# Patient Record
Sex: MALE | Race: BLACK OR AFRICAN AMERICAN | Employment: FULL TIME | URBAN - METROPOLITAN AREA
[De-identification: names, ages, dates, MRNs, and addresses within clinical notes are randomized per-mention and may not be internally consistent; named-entity substitution may affect disease eponyms.]

---

## 2019-01-23 ENCOUNTER — ANESTHESIA EVENT (OUTPATIENT)
Dept: OPERATING ROOM | Age: 44
End: 2019-01-23
Payer: COMMERCIAL

## 2019-01-25 ENCOUNTER — HOSPITAL ENCOUNTER (OUTPATIENT)
Age: 44
Setting detail: OUTPATIENT SURGERY
Discharge: HOME | End: 2019-01-25
Attending: OPHTHALMOLOGY | Admitting: OPHTHALMOLOGY
Payer: COMMERCIAL

## 2019-01-25 ENCOUNTER — ANESTHESIA (OUTPATIENT)
Dept: OPERATING ROOM | Age: 44
End: 2019-01-25
Payer: COMMERCIAL

## 2019-01-25 VITALS
SYSTOLIC BLOOD PRESSURE: 110 MMHG | RESPIRATION RATE: 17 BRPM | DIASTOLIC BLOOD PRESSURE: 55 MMHG | WEIGHT: 215 LBS | BODY MASS INDEX: 29.12 KG/M2 | OXYGEN SATURATION: 96 % | TEMPERATURE: 97.2 F | HEIGHT: 72 IN | HEART RATE: 64 BPM

## 2019-01-25 VITALS — DIASTOLIC BLOOD PRESSURE: 59 MMHG | OXYGEN SATURATION: 94 % | SYSTOLIC BLOOD PRESSURE: 115 MMHG | TEMPERATURE: 96.1 F

## 2019-01-25 DIAGNOSIS — H50.011 MONOCULAR ESOTROPIA OF RIGHT EYE: Primary | ICD-10-CM

## 2019-01-25 LAB
ANION GAP SERPL CALCULATED.3IONS-SCNC: 10 MMOL/L (ref 7–16)
BUN BLDV-MCNC: 20 MG/DL (ref 6–20)
CALCIUM SERPL-MCNC: 9.3 MG/DL (ref 8.6–10.2)
CHLORIDE BLD-SCNC: 106 MMOL/L (ref 98–107)
CO2: 24 MMOL/L (ref 22–29)
CREAT SERPL-MCNC: 1.1 MG/DL (ref 0.7–1.2)
GFR AFRICAN AMERICAN: >60
GFR NON-AFRICAN AMERICAN: >60 ML/MIN/1.73
GLUCOSE BLD-MCNC: 95 MG/DL (ref 74–99)
HCT VFR BLD CALC: 47.1 % (ref 37–54)
HEMOGLOBIN: 16 G/DL (ref 12.5–16.5)
MCH RBC QN AUTO: 30 PG (ref 26–35)
MCHC RBC AUTO-ENTMCNC: 34 % (ref 32–34.5)
MCV RBC AUTO: 88.2 FL (ref 80–99.9)
PDW BLD-RTO: 13.3 FL (ref 11.5–15)
PLATELET # BLD: 189 E9/L (ref 130–450)
PMV BLD AUTO: 11.1 FL (ref 7–12)
POTASSIUM SERPL-SCNC: 4.2 MMOL/L (ref 3.5–5)
RBC # BLD: 5.34 E12/L (ref 3.8–5.8)
SODIUM BLD-SCNC: 140 MMOL/L (ref 132–146)
WBC # BLD: 6.7 E9/L (ref 4.5–11.5)

## 2019-01-25 PROCEDURE — 7100000010 HC PHASE II RECOVERY - FIRST 15 MIN: Performed by: OPHTHALMOLOGY

## 2019-01-25 PROCEDURE — 7100000011 HC PHASE II RECOVERY - ADDTL 15 MIN: Performed by: OPHTHALMOLOGY

## 2019-01-25 PROCEDURE — 6360000002 HC RX W HCPCS

## 2019-01-25 PROCEDURE — 80048 BASIC METABOLIC PNL TOTAL CA: CPT

## 2019-01-25 PROCEDURE — 2709999900 HC NON-CHARGEABLE SUPPLY: Performed by: OPHTHALMOLOGY

## 2019-01-25 PROCEDURE — 2500000003 HC RX 250 WO HCPCS

## 2019-01-25 PROCEDURE — 6370000000 HC RX 637 (ALT 250 FOR IP)

## 2019-01-25 PROCEDURE — 7100000001 HC PACU RECOVERY - ADDTL 15 MIN: Performed by: OPHTHALMOLOGY

## 2019-01-25 PROCEDURE — 7100000000 HC PACU RECOVERY - FIRST 15 MIN: Performed by: OPHTHALMOLOGY

## 2019-01-25 PROCEDURE — 36415 COLL VENOUS BLD VENIPUNCTURE: CPT

## 2019-01-25 PROCEDURE — 85027 COMPLETE CBC AUTOMATED: CPT

## 2019-01-25 PROCEDURE — 2580000003 HC RX 258

## 2019-01-25 PROCEDURE — 3700000000 HC ANESTHESIA ATTENDED CARE: Performed by: OPHTHALMOLOGY

## 2019-01-25 PROCEDURE — 3600000013 HC SURGERY LEVEL 3 ADDTL 15MIN: Performed by: OPHTHALMOLOGY

## 2019-01-25 PROCEDURE — 6370000000 HC RX 637 (ALT 250 FOR IP): Performed by: OPHTHALMOLOGY

## 2019-01-25 PROCEDURE — 3700000001 HC ADD 15 MINUTES (ANESTHESIA): Performed by: OPHTHALMOLOGY

## 2019-01-25 PROCEDURE — 3600000003 HC SURGERY LEVEL 3 BASE: Performed by: OPHTHALMOLOGY

## 2019-01-25 RX ORDER — DEXAMETHASONE SODIUM PHOSPHATE 4 MG/ML
INJECTION, SOLUTION INTRA-ARTICULAR; INTRALESIONAL; INTRAMUSCULAR; INTRAVENOUS; SOFT TISSUE PRN
Status: DISCONTINUED | OUTPATIENT
Start: 2019-01-25 | End: 2019-01-25 | Stop reason: SDUPTHER

## 2019-01-25 RX ORDER — OXYCODONE HYDROCHLORIDE AND ACETAMINOPHEN 5; 325 MG/1; MG/1
1 TABLET ORAL
Status: DISCONTINUED | OUTPATIENT
Start: 2019-01-25 | End: 2019-01-25 | Stop reason: HOSPADM

## 2019-01-25 RX ORDER — PROMETHAZINE HYDROCHLORIDE 25 MG/ML
6.25 INJECTION, SOLUTION INTRAMUSCULAR; INTRAVENOUS
Status: DISCONTINUED | OUTPATIENT
Start: 2019-01-25 | End: 2019-01-25 | Stop reason: HOSPADM

## 2019-01-25 RX ORDER — SODIUM CHLORIDE, SODIUM LACTATE, POTASSIUM CHLORIDE, CALCIUM CHLORIDE 600; 310; 30; 20 MG/100ML; MG/100ML; MG/100ML; MG/100ML
INJECTION, SOLUTION INTRAVENOUS CONTINUOUS PRN
Status: DISCONTINUED | OUTPATIENT
Start: 2019-01-25 | End: 2019-01-25 | Stop reason: SDUPTHER

## 2019-01-25 RX ORDER — PROPOFOL 10 MG/ML
INJECTION, EMULSION INTRAVENOUS PRN
Status: DISCONTINUED | OUTPATIENT
Start: 2019-01-25 | End: 2019-01-25 | Stop reason: SDUPTHER

## 2019-01-25 RX ORDER — GLYCOPYRROLATE 0.2 MG/ML
INJECTION INTRAMUSCULAR; INTRAVENOUS PRN
Status: DISCONTINUED | OUTPATIENT
Start: 2019-01-25 | End: 2019-01-25 | Stop reason: SDUPTHER

## 2019-01-25 RX ORDER — FENTANYL CITRATE 50 UG/ML
25 INJECTION, SOLUTION INTRAMUSCULAR; INTRAVENOUS EVERY 5 MIN PRN
Status: DISCONTINUED | OUTPATIENT
Start: 2019-01-25 | End: 2019-01-25 | Stop reason: HOSPADM

## 2019-01-25 RX ORDER — NEOSTIGMINE METHYLSULFATE 1 MG/ML
INJECTION, SOLUTION INTRAVENOUS PRN
Status: DISCONTINUED | OUTPATIENT
Start: 2019-01-25 | End: 2019-01-25 | Stop reason: SDUPTHER

## 2019-01-25 RX ORDER — SUCCINYLCHOLINE CHLORIDE 20 MG/ML
INJECTION INTRAMUSCULAR; INTRAVENOUS PRN
Status: DISCONTINUED | OUTPATIENT
Start: 2019-01-25 | End: 2019-01-25 | Stop reason: SDUPTHER

## 2019-01-25 RX ORDER — NEOMYCIN SULFATE, POLYMYXIN B SULFATE, AND DEXAMETHASONE 3.5; 10000; 1 MG/G; [USP'U]/G; MG/G
OINTMENT OPHTHALMIC PRN
Status: DISCONTINUED | OUTPATIENT
Start: 2019-01-25 | End: 2019-01-25 | Stop reason: HOSPADM

## 2019-01-25 RX ORDER — MEPERIDINE HYDROCHLORIDE 25 MG/ML
12.5 INJECTION INTRAMUSCULAR; INTRAVENOUS; SUBCUTANEOUS EVERY 5 MIN PRN
Status: DISCONTINUED | OUTPATIENT
Start: 2019-01-25 | End: 2019-01-25 | Stop reason: HOSPADM

## 2019-01-25 RX ORDER — LIDOCAINE HYDROCHLORIDE 20 MG/ML
INJECTION, SOLUTION EPIDURAL; INFILTRATION; INTRACAUDAL; PERINEURAL PRN
Status: DISCONTINUED | OUTPATIENT
Start: 2019-01-25 | End: 2019-01-25 | Stop reason: SDUPTHER

## 2019-01-25 RX ORDER — FENTANYL CITRATE 50 UG/ML
50 INJECTION, SOLUTION INTRAMUSCULAR; INTRAVENOUS EVERY 5 MIN PRN
Status: DISCONTINUED | OUTPATIENT
Start: 2019-01-25 | End: 2019-01-25 | Stop reason: HOSPADM

## 2019-01-25 RX ORDER — MIDAZOLAM HYDROCHLORIDE 1 MG/ML
INJECTION INTRAMUSCULAR; INTRAVENOUS PRN
Status: DISCONTINUED | OUTPATIENT
Start: 2019-01-25 | End: 2019-01-25 | Stop reason: SDUPTHER

## 2019-01-25 RX ORDER — DIPHENHYDRAMINE HYDROCHLORIDE 50 MG/ML
12.5 INJECTION INTRAMUSCULAR; INTRAVENOUS
Status: DISCONTINUED | OUTPATIENT
Start: 2019-01-25 | End: 2019-01-25 | Stop reason: HOSPADM

## 2019-01-25 RX ORDER — PHENYLEPHRINE HCL 2.5 %
DROPS OPHTHALMIC (EYE) PRN
Status: DISCONTINUED | OUTPATIENT
Start: 2019-01-25 | End: 2019-01-25 | Stop reason: HOSPADM

## 2019-01-25 RX ORDER — ROCURONIUM BROMIDE 10 MG/ML
INJECTION, SOLUTION INTRAVENOUS PRN
Status: DISCONTINUED | OUTPATIENT
Start: 2019-01-25 | End: 2019-01-25 | Stop reason: SDUPTHER

## 2019-01-25 RX ORDER — ESMOLOL HYDROCHLORIDE 10 MG/ML
INJECTION INTRAVENOUS PRN
Status: DISCONTINUED | OUTPATIENT
Start: 2019-01-25 | End: 2019-01-25 | Stop reason: SDUPTHER

## 2019-01-25 RX ORDER — ONDANSETRON 2 MG/ML
INJECTION INTRAMUSCULAR; INTRAVENOUS PRN
Status: DISCONTINUED | OUTPATIENT
Start: 2019-01-25 | End: 2019-01-25 | Stop reason: SDUPTHER

## 2019-01-25 RX ORDER — FENTANYL CITRATE 50 UG/ML
INJECTION, SOLUTION INTRAMUSCULAR; INTRAVENOUS PRN
Status: DISCONTINUED | OUTPATIENT
Start: 2019-01-25 | End: 2019-01-25 | Stop reason: SDUPTHER

## 2019-01-25 RX ORDER — ACETAMINOPHEN AND CODEINE PHOSPHATE 300; 30 MG/1; MG/1
1-2 TABLET ORAL EVERY 8 HOURS PRN
Qty: 30 TABLET | Refills: 0 | Status: SHIPPED | OUTPATIENT
Start: 2019-01-25 | End: 2019-02-01

## 2019-01-25 RX ADMIN — MIDAZOLAM HYDROCHLORIDE 2 MG: 1 INJECTION, SOLUTION INTRAMUSCULAR; INTRAVENOUS at 07:16

## 2019-01-25 RX ADMIN — GLYCOPYRROLATE 0.2 MG: 0.2 INJECTION, SOLUTION INTRAMUSCULAR; INTRAVENOUS at 08:03

## 2019-01-25 RX ADMIN — FENTANYL CITRATE 50 MCG: 50 INJECTION, SOLUTION INTRAMUSCULAR; INTRAVENOUS at 07:29

## 2019-01-25 RX ADMIN — DEXAMETHASONE SODIUM PHOSPHATE 10 MG: 4 INJECTION, SOLUTION INTRAMUSCULAR; INTRAVENOUS at 07:29

## 2019-01-25 RX ADMIN — PROPOFOL 160 MG: 10 INJECTION, EMULSION INTRAVENOUS at 07:20

## 2019-01-25 RX ADMIN — PHENYLEPHRINE HYDROCHLORIDE 50 MCG: 10 INJECTION INTRAVENOUS at 07:20

## 2019-01-25 RX ADMIN — PHENYLEPHRINE HYDROCHLORIDE 50 MCG: 10 INJECTION INTRAVENOUS at 07:35

## 2019-01-25 RX ADMIN — PHENYLEPHRINE HYDROCHLORIDE 50 MCG: 10 INJECTION INTRAVENOUS at 07:33

## 2019-01-25 RX ADMIN — SUCCINYLCHOLINE CHLORIDE 120 MG: 20 INJECTION, SOLUTION INTRAMUSCULAR; INTRAVENOUS at 07:20

## 2019-01-25 RX ADMIN — PHENYLEPHRINE HYDROCHLORIDE 50 MCG: 10 INJECTION INTRAVENOUS at 07:55

## 2019-01-25 RX ADMIN — ONDANSETRON HYDROCHLORIDE 4 MG: 2 INJECTION, SOLUTION INTRAMUSCULAR; INTRAVENOUS at 07:20

## 2019-01-25 RX ADMIN — SODIUM CHLORIDE, POTASSIUM CHLORIDE, SODIUM LACTATE AND CALCIUM CHLORIDE: 600; 310; 30; 20 INJECTION, SOLUTION INTRAVENOUS at 07:16

## 2019-01-25 RX ADMIN — ESMOLOL HYDROCHLORIDE 5 MG: 10 INJECTION, SOLUTION INTRAVENOUS at 07:20

## 2019-01-25 RX ADMIN — FENTANYL CITRATE 50 MCG: 50 INJECTION, SOLUTION INTRAMUSCULAR; INTRAVENOUS at 07:20

## 2019-01-25 RX ADMIN — PHENYLEPHRINE HYDROCHLORIDE 50 MCG: 10 INJECTION INTRAVENOUS at 07:42

## 2019-01-25 RX ADMIN — LIDOCAINE HYDROCHLORIDE 100 MG: 20 INJECTION, SOLUTION EPIDURAL; INFILTRATION; INTRACAUDAL; PERINEURAL at 07:20

## 2019-01-25 RX ADMIN — ESMOLOL HYDROCHLORIDE 5 MG: 10 INJECTION, SOLUTION INTRAVENOUS at 07:25

## 2019-01-25 RX ADMIN — FENTANYL CITRATE 25 MCG: 50 INJECTION, SOLUTION INTRAMUSCULAR; INTRAVENOUS at 07:55

## 2019-01-25 RX ADMIN — Medication 3 MG: at 08:03

## 2019-01-25 RX ADMIN — ROCURONIUM BROMIDE 20 MG: 10 SOLUTION INTRAVENOUS at 07:29

## 2019-01-25 ASSESSMENT — PULMONARY FUNCTION TESTS
PIF_VALUE: 19
PIF_VALUE: 17
PIF_VALUE: 17
PIF_VALUE: 16
PIF_VALUE: 17
PIF_VALUE: 6
PIF_VALUE: 17
PIF_VALUE: 18
PIF_VALUE: 2
PIF_VALUE: 7
PIF_VALUE: 7
PIF_VALUE: 17
PIF_VALUE: 2
PIF_VALUE: 17
PIF_VALUE: 17
PIF_VALUE: 3
PIF_VALUE: 24
PIF_VALUE: 10
PIF_VALUE: 1
PIF_VALUE: 2
PIF_VALUE: 12
PIF_VALUE: 17
PIF_VALUE: 17
PIF_VALUE: 2
PIF_VALUE: 17
PIF_VALUE: 17
PIF_VALUE: 18
PIF_VALUE: 13
PIF_VALUE: 17
PIF_VALUE: 17
PIF_VALUE: 6
PIF_VALUE: 2
PIF_VALUE: 17
PIF_VALUE: 2
PIF_VALUE: 17
PIF_VALUE: 26
PIF_VALUE: 25
PIF_VALUE: 1
PIF_VALUE: 17
PIF_VALUE: 5
PIF_VALUE: 10
PIF_VALUE: 17
PIF_VALUE: 17
PIF_VALUE: 3
PIF_VALUE: 15
PIF_VALUE: 22
PIF_VALUE: 16
PIF_VALUE: 1
PIF_VALUE: 17
PIF_VALUE: 3
PIF_VALUE: 17
PIF_VALUE: 2
PIF_VALUE: 17
PIF_VALUE: 1
PIF_VALUE: 1
PIF_VALUE: 17
PIF_VALUE: 5
PIF_VALUE: 8
PIF_VALUE: 12
PIF_VALUE: 2

## 2019-01-25 ASSESSMENT — PAIN SCALES - GENERAL
PAINLEVEL_OUTOF10: 0

## 2019-01-25 ASSESSMENT — PAIN - FUNCTIONAL ASSESSMENT: PAIN_FUNCTIONAL_ASSESSMENT: 0-10

## 2019-06-21 ENCOUNTER — HOSPITAL ENCOUNTER (OUTPATIENT)
Dept: NON INVASIVE DIAGNOSTICS | Age: 44
Discharge: HOME | End: 2019-06-21
Payer: COMMERCIAL

## 2019-06-21 ENCOUNTER — HOSPITAL ENCOUNTER (OUTPATIENT)
Dept: NUCLEAR MEDICINE | Age: 44
Discharge: HOME | End: 2019-06-21
Payer: COMMERCIAL

## 2019-06-21 VITALS — HEIGHT: 72 IN | WEIGHT: 210 LBS | BODY MASS INDEX: 28.44 KG/M2

## 2019-06-21 LAB
LV EF: 69 %
LVEF MODALITY: NORMAL

## 2019-06-21 PROCEDURE — 93017 CV STRESS TEST TRACING ONLY: CPT

## 2019-06-21 PROCEDURE — 3430000000 HC RX DIAGNOSTIC RADIOPHARMACEUTICAL: Performed by: RADIOLOGY

## 2019-06-21 PROCEDURE — 6360000002 HC RX W HCPCS: Performed by: GENERAL PRACTICE

## 2019-06-21 PROCEDURE — A9500 TC99M SESTAMIBI: HCPCS | Performed by: RADIOLOGY

## 2019-06-21 PROCEDURE — 93018 CV STRESS TEST I&R ONLY: CPT | Performed by: INTERNAL MEDICINE

## 2019-06-21 PROCEDURE — 78452 HT MUSCLE IMAGE SPECT MULT: CPT

## 2019-06-21 PROCEDURE — 93016 CV STRESS TEST SUPVJ ONLY: CPT | Performed by: INTERNAL MEDICINE

## 2019-06-21 RX ADMIN — Medication 10 MILLICURIE: at 10:07

## 2019-06-21 RX ADMIN — REGADENOSON 0.4 MG: 0.08 INJECTION, SOLUTION INTRAVENOUS at 11:17

## 2019-06-21 RX ADMIN — Medication 30 MILLICURIE: at 10:07

## 2019-06-21 NOTE — PROGRESS NOTES
.        Stress Lab:  A Lexiscan   stress protocol was performed. There was no angina, significant arrhythmia or ST segment shift. The EKG is considered nonischemic. Isotope was injected.

## 2019-07-17 ENCOUNTER — OFFICE VISIT (OUTPATIENT)
Dept: CARDIOLOGY CLINIC | Age: 44
End: 2019-07-17
Payer: COMMERCIAL

## 2019-07-17 VITALS
DIASTOLIC BLOOD PRESSURE: 70 MMHG | WEIGHT: 222 LBS | SYSTOLIC BLOOD PRESSURE: 110 MMHG | HEIGHT: 72 IN | HEART RATE: 83 BPM | BODY MASS INDEX: 30.07 KG/M2

## 2019-07-17 DIAGNOSIS — E78.00 HYPERCHOLESTEREMIA: ICD-10-CM

## 2019-07-17 DIAGNOSIS — Z87.820 H/O TRAUMATIC BRAIN INJURY: ICD-10-CM

## 2019-07-17 DIAGNOSIS — R94.31 ABNORMAL EKG: ICD-10-CM

## 2019-07-17 DIAGNOSIS — R94.39 ABNORMAL NUCLEAR STRESS TEST: Primary | ICD-10-CM

## 2019-07-17 DIAGNOSIS — E66.9 MILD OBESITY: ICD-10-CM

## 2019-07-17 DIAGNOSIS — K21.9 GASTROESOPHAGEAL REFLUX DISEASE, ESOPHAGITIS PRESENCE NOT SPECIFIED: ICD-10-CM

## 2019-07-17 DIAGNOSIS — I42.1 HOCM (HYPERTROPHIC OBSTRUCTIVE CARDIOMYOPATHY) (HCC): ICD-10-CM

## 2019-07-17 PROCEDURE — 93000 ELECTROCARDIOGRAM COMPLETE: CPT | Performed by: INTERNAL MEDICINE

## 2019-07-17 PROCEDURE — 99244 OFF/OP CNSLTJ NEW/EST MOD 40: CPT | Performed by: INTERNAL MEDICINE

## 2019-07-17 RX ORDER — TRAMADOL HYDROCHLORIDE 50 MG/1
50 TABLET ORAL EVERY 6 HOURS PRN
Refills: 0 | COMMUNITY
Start: 2019-07-09

## 2019-07-17 RX ORDER — OMEPRAZOLE 20 MG/1
20 CAPSULE, DELAYED RELEASE ORAL
Refills: 0 | COMMUNITY
Start: 2019-06-17

## 2019-07-17 RX ORDER — METOPROLOL TARTRATE 100 MG/1
100 TABLET ORAL 2 TIMES DAILY
Refills: 0 | COMMUNITY
Start: 2019-06-17 | End: 2022-03-10 | Stop reason: CLARIF

## 2019-07-17 NOTE — PATIENT INSTRUCTIONS
the  may tell you to chew 1 adult-strength or 2 to 4 low-dose aspirin. Wait for an ambulance. Do not try to drive yourself.   Watch closely for changes in your health, and be sure to contact your doctor if you have any problems. Where can you learn more? Go to https://chpepiceweb.[employer]. org and sign in to your [employer] account. Enter F573 in the aWhere box to learn more about \"A Healthy Heart: Care Instructions. \"     If you do not have an account, please click on the \"Sign Up Now\" link. Current as of: July 22, 2018  Content Version: 12.0  © 8324-9029 Healthwise, Incorporated. Care instructions adapted under license by [facility] ([facility]). If you have questions about a medical condition or this instruction, always ask your healthcare professional. [address] any warranty or liability for your use of this information.

## 2019-07-24 ENCOUNTER — HOSPITAL ENCOUNTER (OUTPATIENT)
Age: 44
Discharge: HOME | End: 2019-07-24
Payer: COMMERCIAL

## 2019-07-24 ENCOUNTER — HOSPITAL ENCOUNTER (OUTPATIENT)
Age: 44
Discharge: HOME | End: 2019-07-26
Payer: COMMERCIAL

## 2019-07-24 ENCOUNTER — HOSPITAL ENCOUNTER (OUTPATIENT)
Dept: GENERAL RADIOLOGY | Age: 44
Discharge: HOME | End: 2019-07-26
Payer: COMMERCIAL

## 2019-07-24 DIAGNOSIS — R94.31 ABNORMAL EKG: ICD-10-CM

## 2019-07-24 DIAGNOSIS — R94.39 ABNORMAL NUCLEAR STRESS TEST: ICD-10-CM

## 2019-07-24 LAB
ALBUMIN SERPL-MCNC: 4.4 G/DL (ref 3.5–5.2)
ALP BLD-CCNC: 58 U/L (ref 40–129)
ALT SERPL-CCNC: 43 U/L (ref 0–40)
ANION GAP SERPL CALCULATED.3IONS-SCNC: 12 MMOL/L (ref 7–16)
AST SERPL-CCNC: 19 U/L (ref 0–39)
BASOPHILS ABSOLUTE: 0.04 E9/L (ref 0–0.2)
BASOPHILS RELATIVE PERCENT: 0.6 % (ref 0–2)
BILIRUB SERPL-MCNC: 1.4 MG/DL (ref 0–1.2)
BUN BLDV-MCNC: 16 MG/DL (ref 6–20)
CALCIUM SERPL-MCNC: 9.5 MG/DL (ref 8.6–10.2)
CHLORIDE BLD-SCNC: 107 MMOL/L (ref 98–107)
CO2: 26 MMOL/L (ref 22–29)
CREAT SERPL-MCNC: 1.3 MG/DL (ref 0.7–1.2)
EOSINOPHILS ABSOLUTE: 0.28 E9/L (ref 0.05–0.5)
EOSINOPHILS RELATIVE PERCENT: 4.4 % (ref 0–6)
GFR AFRICAN AMERICAN: >60
GFR NON-AFRICAN AMERICAN: >60 ML/MIN/1.73
GLUCOSE BLD-MCNC: 96 MG/DL (ref 74–99)
HCT VFR BLD CALC: 45.7 % (ref 37–54)
HEMOGLOBIN: 15.7 G/DL (ref 12.5–16.5)
IMMATURE GRANULOCYTES #: 0 E9/L
IMMATURE GRANULOCYTES %: 0 % (ref 0–5)
INR BLD: 1.1
LYMPHOCYTES ABSOLUTE: 1.98 E9/L (ref 1.5–4)
LYMPHOCYTES RELATIVE PERCENT: 30.8 % (ref 20–42)
MCH RBC QN AUTO: 30.2 PG (ref 26–35)
MCHC RBC AUTO-ENTMCNC: 34.4 % (ref 32–34.5)
MCV RBC AUTO: 87.9 FL (ref 80–99.9)
MONOCYTES ABSOLUTE: 0.7 E9/L (ref 0.1–0.95)
MONOCYTES RELATIVE PERCENT: 10.9 % (ref 2–12)
NEUTROPHILS ABSOLUTE: 3.43 E9/L (ref 1.8–7.3)
NEUTROPHILS RELATIVE PERCENT: 53.3 % (ref 43–80)
PDW BLD-RTO: 13.4 FL (ref 11.5–15)
PLATELET # BLD: 193 E9/L (ref 130–450)
PMV BLD AUTO: 11.1 FL (ref 7–12)
POTASSIUM SERPL-SCNC: 4.2 MMOL/L (ref 3.5–5)
PROTHROMBIN TIME: 12.3 SEC (ref 9.3–12.4)
RBC # BLD: 5.2 E12/L (ref 3.8–5.8)
SODIUM BLD-SCNC: 145 MMOL/L (ref 132–146)
TOTAL PROTEIN: 7.1 G/DL (ref 6.4–8.3)
WBC # BLD: 6.4 E9/L (ref 4.5–11.5)

## 2019-07-24 PROCEDURE — 85025 COMPLETE CBC W/AUTO DIFF WBC: CPT

## 2019-07-24 PROCEDURE — 85610 PROTHROMBIN TIME: CPT

## 2019-07-24 PROCEDURE — 36415 COLL VENOUS BLD VENIPUNCTURE: CPT

## 2019-07-24 PROCEDURE — 80053 COMPREHEN METABOLIC PANEL: CPT

## 2019-07-24 PROCEDURE — 71046 X-RAY EXAM CHEST 2 VIEWS: CPT

## 2019-07-26 ENCOUNTER — HOSPITAL ENCOUNTER (OUTPATIENT)
Dept: CARDIAC CATH/INVASIVE PROCEDURES | Age: 44
Discharge: HOME | End: 2019-07-26
Payer: COMMERCIAL

## 2019-07-26 VITALS
SYSTOLIC BLOOD PRESSURE: 130 MMHG | BODY MASS INDEX: 30.1 KG/M2 | HEIGHT: 71 IN | WEIGHT: 215 LBS | TEMPERATURE: 98.1 F | OXYGEN SATURATION: 96 % | HEART RATE: 65 BPM | RESPIRATION RATE: 16 BRPM | DIASTOLIC BLOOD PRESSURE: 72 MMHG

## 2019-07-26 LAB
ABO/RH: NORMAL
ANTIBODY SCREEN: NORMAL

## 2019-07-26 PROCEDURE — 2709999900 HC NON-CHARGEABLE SUPPLY

## 2019-07-26 PROCEDURE — 93458 L HRT ARTERY/VENTRICLE ANGIO: CPT | Performed by: INTERNAL MEDICINE

## 2019-07-26 PROCEDURE — C1894 INTRO/SHEATH, NON-LASER: HCPCS

## 2019-07-26 PROCEDURE — 6360000002 HC RX W HCPCS

## 2019-07-26 PROCEDURE — 86850 RBC ANTIBODY SCREEN: CPT

## 2019-07-26 PROCEDURE — C1725 CATH, TRANSLUMIN NON-LASER: HCPCS

## 2019-07-26 PROCEDURE — 2500000003 HC RX 250 WO HCPCS

## 2019-07-26 PROCEDURE — C1769 GUIDE WIRE: HCPCS

## 2019-07-26 PROCEDURE — 36415 COLL VENOUS BLD VENIPUNCTURE: CPT

## 2019-07-26 PROCEDURE — 86901 BLOOD TYPING SEROLOGIC RH(D): CPT

## 2019-07-26 PROCEDURE — 86900 BLOOD TYPING SEROLOGIC ABO: CPT

## 2019-07-26 RX ORDER — SODIUM CHLORIDE 9 MG/ML
INJECTION, SOLUTION INTRAVENOUS CONTINUOUS
OUTPATIENT
Start: 2019-07-26

## 2019-08-08 DIAGNOSIS — R94.39 ABNORMAL NUCLEAR STRESS TEST: ICD-10-CM

## 2019-08-08 DIAGNOSIS — R94.31 ABNORMAL EKG: ICD-10-CM

## 2020-03-10 ENCOUNTER — INITIAL CONSULT (OUTPATIENT)
Dept: NEUROSURGERY | Age: 45
End: 2020-03-10
Payer: COMMERCIAL

## 2020-03-10 VITALS
DIASTOLIC BLOOD PRESSURE: 79 MMHG | BODY MASS INDEX: 31.19 KG/M2 | WEIGHT: 222.8 LBS | SYSTOLIC BLOOD PRESSURE: 121 MMHG | HEIGHT: 71 IN | HEART RATE: 70 BPM

## 2020-03-10 PROCEDURE — 99203 OFFICE O/P NEW LOW 30 MIN: CPT | Performed by: PHYSICIAN ASSISTANT

## 2020-03-10 RX ORDER — PRAZOSIN HYDROCHLORIDE 2 MG/1
2 CAPSULE ORAL NIGHTLY
COMMUNITY

## 2020-03-10 ASSESSMENT — ENCOUNTER SYMPTOMS
RESPIRATORY NEGATIVE: 1
GASTROINTESTINAL NEGATIVE: 1
ALLERGIC/IMMUNOLOGIC NEGATIVE: 1
EYES NEGATIVE: 1
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
## 2020-03-19 ENCOUNTER — OFFICE VISIT (OUTPATIENT)
Dept: PAIN MANAGEMENT | Age: 45
End: 2020-03-19
Payer: COMMERCIAL

## 2020-03-19 VITALS
TEMPERATURE: 98.5 F | RESPIRATION RATE: 16 BRPM | HEART RATE: 74 BPM | SYSTOLIC BLOOD PRESSURE: 104 MMHG | WEIGHT: 212 LBS | DIASTOLIC BLOOD PRESSURE: 78 MMHG | HEIGHT: 72 IN | BODY MASS INDEX: 28.71 KG/M2

## 2020-03-19 PROBLEM — M47.812 CERVICAL SPONDYLOSIS: Status: ACTIVE | Noted: 2020-03-19

## 2020-03-19 PROBLEM — M50.90 CERVICAL DISC DISORDER: Status: ACTIVE | Noted: 2020-03-19

## 2020-03-19 PROBLEM — M54.2 NECK PAIN: Status: ACTIVE | Noted: 2020-03-19

## 2020-03-19 PROBLEM — M54.12 CERVICAL RADICULOPATHY: Status: ACTIVE | Noted: 2020-03-19

## 2020-03-19 PROCEDURE — 99204 OFFICE O/P NEW MOD 45 MIN: CPT | Performed by: PAIN MEDICINE

## 2020-03-19 NOTE — PROGRESS NOTES
Do you currently have any of the following:    Fever: No  Headache:  No  Cough: No  Shortness of breath: No  Exposed to anyone with these symptoms: No                                                                                                                Floyd Hamilton presents to the [address] [id number] on 3/19/2020. Sunny Ascencio is complaining of pain in his neck and low back. . The pain is constant. The pain is described as throbbing, shooting, sharp and miserable. Pain is rated on his best day at a 6, on his worst day at a 8, and on average at a 7 on the VAS scale. He took his last dose of Tramadol week ago. Sunny Ascencio does not have issues with constipation. Any procedures since your last visit: No    He is  on NSAIDS and  is not on anticoagulation medications to include ASA and is managed by Dr Lizz Dailey in [place]. Pacemaker or defibrilator: No.       /78   Pulse 74   Temp 98.5 °F (36.9 °C) (Oral)   Resp 16   Ht 6' (1.829 m)   Wt 212 lb (96.2 kg)   BMI 28.75 kg/m²      No LMP for male patient.
by mouth  6/17/19  Yes Historical Provider, MD   Lutein 6 MG CAPS Take 1 tablet by mouth daily Ld 1/21/2019   Yes Historical Provider, MD   Multiple Vitamins-Minerals (THERAPEUTIC MULTIVITAMIN-MINERALS) tablet Take 1 tablet by mouth daily Ld 1/21/2019   Yes Historical Provider, MD   simvastatin (ZOCOR) 40 MG tablet Take 40 mg by mouth nightly.    Yes Historical Provider, MD   metoprolol (TOPROL-XL) 50 MG XL tablet Take 100 mg by mouth 2 times daily Instructed to take am of procedure   Yes Historical Provider, MD   aspirin 81 MG EC tablet Take 81 mg by mouth daily Pt instructed to check hold with Dr. Cindy Wilkins   Yes Historical Provider, MD     No Known Allergies    Social History     Socioeconomic History    Marital status:      Spouse name: Not on file    Number of children: Not on file    Years of education: Not on file    Highest education level: Not on file   Occupational History    Not on file   Social Needs    Financial resource strain: Not on file    Food insecurity     Worry: Not on file     Inability: Not on file    Transportation needs     Medical: Not on file     Non-medical: Not on file   Tobacco Use    Smoking status: Never Smoker    Smokeless tobacco: Never Used   Substance and Sexual Activity    Alcohol use: No     Comment: Rarely, No coffee    Drug use: No    Sexual activity: Not Currently   Lifestyle    Physical activity     Days per week: Not on file     Minutes per session: Not on file    Stress: Not on file   Relationships    Social connections     Talks on phone: Not on file     Gets together: Not on file     Attends Voodoo service: Not on file     Active member of club or organization: Not on file     Attends meetings of clubs or organizations: Not on file     Relationship status: Not on file    Intimate partner violence     Fear of current or ex partner: Not on file     Emotionally abused: Not on file     Physically abused: Not on file     Forced sexual activity: Not on

## 2020-05-12 ENCOUNTER — TELEPHONE (OUTPATIENT)
Dept: PAIN MANAGEMENT | Age: 45
End: 2020-05-12

## 2020-05-19 ENCOUNTER — VIRTUAL VISIT (OUTPATIENT)
Dept: PAIN MANAGEMENT | Age: 45
End: 2020-05-19
Payer: COMMERCIAL

## 2020-05-19 PROCEDURE — 99441 PR PHYS/QHP TELEPHONE EVALUATION 5-10 MIN: CPT | Performed by: PAIN MEDICINE

## 2020-05-19 NOTE — PROGRESS NOTES
Mavis Cho was read the following message We want to confirm that, for purposes of billing, this is a virtual visit with your provider for which we will submit a claim for reimbursement with your insurance company. You will be responsible for any copays, coinsurance amounts or other amounts not covered by your insurance company. If you do not accept this, unfortunately we will not be able to schedule a virtual visit with the provider. Do you accept? Lilian Galloway responded Yes .
shortness of breath, new bowel or bladder complaints. All other review of systems was negative. PHYSICAL EXAMINATION:      General:       A & O x3    Lungs:    Breathing:Breathing Pattern: regular, no distress    Impression:    Neck pain with radiation to the Left upper extremity   Cervical spine MRI 2019 C7-T1 Left paracentral disc protrusion  Plan:  Follow up on his neck pain with no acute issues. Patient is scheduled for JAMIN C7-T1 Left paramedian. Re-discussed R/B/A  Continue with Mobic 7.5 mg. Not a candidate for opioids (on medical [facility])  OARRS report reviewed. Patient encouraged to stay active. Treatment plan discussed with the patient including medications and procedure side effects. We discussed with the patient that combining opioids, benzodiazepines, alcohol, illicit drugs or sleep aids increases the risk of respiratory depression including death. We discussed that these medications may cause drowsiness, sedation or dizziness and have counseled the patient not to drive or operate machinery. We have discussed that these medications will be prescribed only by one provider. We have discussed with the patient about age related risk factors and have thoroughly discussed the importance of taking these medications as prescribed. The patient verbalizes understanding. Patient advised regarding steps to help prevent the spread of COVID-19   SOURCE - https://bladimir-sanders.info/. html     1-Stay home except to get medical care  2-Clean your hands often for atleast 20 seconds, avoid touching: Avoid touching your eyes, nose, and mouth with unwashed hands. 3-Seek medical attention: Seek prompt medical attention if your illness is worsening (e.g., difficulty breathing).   Call you doctor first.  3-Wear a facemask if you are sick   4-Cover your coughs and sneezes         I affirm this is a Patient Initiated Episode with an established Patient who has not had a related

## 2020-05-22 ENCOUNTER — HOSPITAL ENCOUNTER (OUTPATIENT)
Age: 45
Discharge: HOME | End: 2020-05-24
Payer: COMMERCIAL

## 2020-05-22 PROCEDURE — U0003 INFECTIOUS AGENT DETECTION BY NUCLEIC ACID (DNA OR RNA); SEVERE ACUTE RESPIRATORY SYNDROME CORONAVIRUS 2 (SARS-COV-2) (CORONAVIRUS DISEASE [COVID-19]), AMPLIFIED PROBE TECHNIQUE, MAKING USE OF HIGH THROUGHPUT TECHNOLOGIES AS DESCRIBED BY CMS-2020-01-R: HCPCS

## 2020-05-24 LAB
SARS-COV-2: NOT DETECTED
SOURCE: NORMAL

## 2020-05-28 ENCOUNTER — HOSPITAL ENCOUNTER (OUTPATIENT)
Age: 45
Setting detail: OUTPATIENT SURGERY
Discharge: HOME | End: 2020-05-28
Attending: PAIN MEDICINE | Admitting: PAIN MEDICINE
Payer: COMMERCIAL

## 2020-05-28 ENCOUNTER — HOSPITAL ENCOUNTER (OUTPATIENT)
Dept: OPERATING ROOM | Age: 45
Setting detail: OUTPATIENT SURGERY
Discharge: HOME | End: 2020-05-28
Attending: PAIN MEDICINE
Payer: COMMERCIAL

## 2020-05-28 VITALS
SYSTOLIC BLOOD PRESSURE: 127 MMHG | HEART RATE: 68 BPM | DIASTOLIC BLOOD PRESSURE: 76 MMHG | RESPIRATION RATE: 20 BRPM | OXYGEN SATURATION: 98 %

## 2020-05-28 PROCEDURE — 7100000011 HC PHASE II RECOVERY - ADDTL 15 MIN: Performed by: PAIN MEDICINE

## 2020-05-28 PROCEDURE — 2500000003 HC RX 250 WO HCPCS: Performed by: PAIN MEDICINE

## 2020-05-28 PROCEDURE — 3209999900 FLUORO FOR SURGICAL PROCEDURES

## 2020-05-28 PROCEDURE — 6360000004 HC RX CONTRAST MEDICATION: Performed by: PAIN MEDICINE

## 2020-05-28 PROCEDURE — 6360000002 HC RX W HCPCS: Performed by: PAIN MEDICINE

## 2020-05-28 PROCEDURE — 2709999900 HC NON-CHARGEABLE SUPPLY: Performed by: PAIN MEDICINE

## 2020-05-28 PROCEDURE — 3600000005 HC SURGERY LEVEL 5 BASE: Performed by: PAIN MEDICINE

## 2020-05-28 PROCEDURE — 7100000010 HC PHASE II RECOVERY - FIRST 15 MIN: Performed by: PAIN MEDICINE

## 2020-05-28 PROCEDURE — 62321 NJX INTERLAMINAR CRV/THRC: CPT | Performed by: PAIN MEDICINE

## 2020-05-28 RX ORDER — LIDOCAINE HYDROCHLORIDE 5 MG/ML
INJECTION, SOLUTION INFILTRATION; INTRAVENOUS PRN
Status: DISCONTINUED | OUTPATIENT
Start: 2020-05-28 | End: 2020-05-28 | Stop reason: ALTCHOICE

## 2020-05-28 ASSESSMENT — PAIN SCALES - GENERAL
PAINLEVEL_OUTOF10: 0
PAINLEVEL_OUTOF10: 0

## 2020-05-28 NOTE — H&P
[address]  [address] [facility], [address] [facility] [address]  [phone]    Procedure History & Physical      Kirk Zafar     HPI:    Patient  is here for Neck and Left arm pain for JAMIN C7-T1  Labs/imaging studies reviewed   All question and concerns addressed including R/B/A associated with the procedure    Past Medical History:   Diagnosis Date    Cardiomyopathy, hypertrophic ([address]     Eye disorder     right    Hyperlipidemia     Hypertrophic cardiomyopathy ([address]     Low back pain     Major depressive disorder     Neck pain     PTSD (post-traumatic stress disorder)        Past Surgical History:   Procedure Laterality Date    EYE MUSCLE SURGERY Right 1/25/2019    RESECTION AND RECESSION RIGHT EYE FOR 37 EXOTROPIA --RIGHT EYE MUSCLE SURGERY performed by London Coles MD at [address]       Prior to Admission medications    Medication Sig Start Date End Date Taking? Authorizing Provider   sertraline (ZOLOFT) 50 MG tablet Take 50 mg by mouth daily   Yes Historical Provider, MD   prazosin (MINIPRESS) 2 MG capsule Take 2 mg by mouth nightly   Yes Historical Provider, MD   traMADol (ULTRAM) 50 MG tablet Take 50 mg by mouth every 6 hours as needed. 7/9/19  Yes Historical Provider, MD   omeprazole (PRILOSEC) 20 MG delayed release capsule Take 20 mg by mouth  6/17/19  Yes Historical Provider, MD   Lutein 6 MG CAPS Take 1 tablet by mouth daily Ld 1/21/2019   Yes Historical Provider, MD   Multiple Vitamins-Minerals (THERAPEUTIC MULTIVITAMIN-MINERALS) tablet Take 1 tablet by mouth daily Ld 1/21/2019   Yes Historical Provider, MD   simvastatin (ZOCOR) 40 MG tablet Take 40 mg by mouth nightly.    Yes Historical Provider, MD   metoprolol (TOPROL-XL) 50 MG XL tablet Take 100 mg by mouth 2 times daily Instructed to take am of procedure   Yes Historical Provider, MD   aspirin 81 MG EC tablet Take 81 mg by mouth daily Pt instructed to check hold with Dr. Elisa Lr Historical Provider, MD metoprolol (LOPRESSOR) 100 MG tablet Take 100 mg by mouth 2 times daily  6/17/19   Historical Provider, MD       No Known Allergies    Social History     Socioeconomic History    Marital status:      Spouse name: Not on file    Number of children: Not on file    Years of education: Not on file    Highest education level: Not on file   Occupational History    Not on file   Social Needs    Financial resource strain: Not on file    Food insecurity     Worry: Not on file     Inability: Not on file    Transportation needs     Medical: Not on file     Non-medical: Not on file   Tobacco Use    Smoking status: Never Smoker    Smokeless tobacco: Never Used   Substance and Sexual Activity    Alcohol use: No     Comment: Rarely, No coffee    Drug use: No    Sexual activity: Not Currently   Lifestyle    Physical activity     Days per week: Not on file     Minutes per session: Not on file    Stress: Not on file   Relationships    Social connections     Talks on phone: Not on file     Gets together: Not on file     Attends Anglican service: Not on file     Active member of club or organization: Not on file     Attends meetings of clubs or organizations: Not on file     Relationship status: Not on file    Intimate partner violence     Fear of current or ex partner: Not on file     Emotionally abused: Not on file     Physically abused: Not on file     Forced sexual activity: Not on file   Other Topics Concern    Not on file   Social History Narrative    Not on file       Family History   Problem Relation Age of Onset    Heart Failure Mother     Cancer Father          REVIEW OF SYSTEMS:    CONSTITUTIONAL:  negative for  fevers, chills, sweats and fatigue    RESPIRATORY:  negative for  dry cough, cough with sputum, dyspnea, wheezing and chest pain    CARDIOVASCULAR:  negative for chest pain, dyspnea, palpitations, syncope    GASTROINTESTINAL:  negative for nausea, vomiting, change in bowel habits,

## 2020-06-30 ENCOUNTER — OFFICE VISIT (OUTPATIENT)
Dept: PAIN MANAGEMENT | Age: 45
End: 2020-06-30
Payer: COMMERCIAL

## 2020-06-30 VITALS
OXYGEN SATURATION: 93 % | RESPIRATION RATE: 16 BRPM | TEMPERATURE: 99.3 F | BODY MASS INDEX: 28.44 KG/M2 | SYSTOLIC BLOOD PRESSURE: 122 MMHG | DIASTOLIC BLOOD PRESSURE: 68 MMHG | HEIGHT: 72 IN | WEIGHT: 210 LBS | HEART RATE: 83 BPM

## 2020-06-30 PROCEDURE — 99213 OFFICE O/P EST LOW 20 MIN: CPT | Performed by: PAIN MEDICINE

## 2020-06-30 NOTE — PROGRESS NOTES
Do you currently have any of the following:    Fever: No  Headache:  No  Cough: No  Shortness of breath: No  Exposed to anyone with these symptoms: No                                                                                                                Regan Chavarria presents to the [facility] on 6/30/2020. Theresa Augustine is complaining of pain neck shoulder and arms. The pain is constant. The pain is described as aching, throbbing, stabbing, sharp, tender and numb. Pain is rated on his best day at a 7, on his worst day at a 10, and on average at a 7 on the VAS scale. He took his last dose of Tramadol today. Theresa Augustine does not have issues with constipation. Any procedures since your last visit: No, with  % relief. He is not on NSAIDS and  is  on anticoagulation medications to include ASA and is managed by Bernice Daniel DO  . Pacemaker or defibrilator: No Physician managing device is . /68   Pulse 83   Temp 99.3 °F (37.4 °C) (Oral)   Resp 16   Ht 6' (1.829 m)   Wt 210 lb (95.3 kg)   SpO2 93%   BMI 28.48 kg/m²      No LMP for male patient.

## 2020-06-30 NOTE — PROGRESS NOTES
on phone: Not on file     Gets together: Not on file     Attends Restorationism service: Not on file     Active member of club or organization: Not on file     Attends meetings of clubs or organizations: Not on file     Relationship status: Not on file    Intimate partner violence     Fear of current or ex partner: Not on file     Emotionally abused: Not on file     Physically abused: Not on file     Forced sexual activity: Not on file   Other Topics Concern    Not on file   Social History Narrative    Not on file     Family History   Problem Relation Age of Onset    Heart Failure Mother     Cancer Father      REVIEW OF SYSTEMS:     Ivan Ugarte denies fever/chills, chest pain, shortness of breath, new bowel or bladder complaints. All other review of systems was negative. PHYSICAL EXAMINATION:      /68   Pulse 83   Temp 99.3 °F (37.4 °C) (Oral)   Resp 16   Ht 6' (1.829 m)   Wt 210 lb (95.3 kg)   SpO2 93%   BMI 28.48 kg/m²     General:       General appearance:   pleasant and well-hydrated. , in moderate discomfort and A & O x3  Build:Normal Weight     HEENT:     Head:normocephalic and atraumatic  Pupils:regular, round and equal.  Sclera: icterus absent,      Lungs:     Breathing:Breathing Pattern: regular, no distress     Abdomen:     Shape:non-distended and normal  Tenderness:none     Cervical spine:     Inspection:normal  Palpation:tenderness paravertebral muscles, facet loading, left, right, negative and tenderness.   Range of motion:abnormal moderately flexion, extension rotation bilateral and is  painful.     Musculoskeletal:     Trigger points in Paravet[place]:absent bilaterally     Extremities:     Tremors:None bilaterally upper and lower  Range of motion:Generally limited flexion, limited extension, abduction above 100 degrees shoulders  Intact:Yes  Edema:Normal     Neurological:     Sensory:normal to light touch upper and lower extremities, Tinel's negative bilateral median nerve  Motor: M.D.

## 2021-02-11 ENCOUNTER — VIRTUAL VISIT (OUTPATIENT)
Dept: PSYCHOLOGY | Age: 46
End: 2021-02-11
Payer: COMMERCIAL

## 2021-02-11 DIAGNOSIS — F32.1 MAJOR DEPRESSIVE DISORDER, SINGLE EPISODE, MODERATE (HCC): ICD-10-CM

## 2021-02-11 DIAGNOSIS — F43.10 POSTTRAUMATIC STRESS DISORDER: Primary | ICD-10-CM

## 2021-02-11 PROCEDURE — 90832 PSYTX W PT 30 MINUTES: CPT | Performed by: PSYCHOLOGIST

## 2021-02-12 NOTE — PROGRESS NOTES
information) for you to participate in telepsychology sessions.  As your psychologist, I may determine that due to certain circumstances, telepsychology is no longer appropriate and that we should resume our sessions in-person. Patient does agree to proceed with telemedicine consultation. Patient's location: [address]     Provider location:  [address] [place] [address] 64  [address]  [address] [place] [address] Dr lomeli to all Video Visits*      Significant Stressors/Changes in Circumstances:   Continued insomnia and chronic pain. Mental Status:    Appearance: casually dressed   Affect:  restricted   Attitude: Cooperative   Mood:   Dysphoric   Thought Process:  Linear and goal directed   Delusions: no evidence of delusions   Perceptions: No perceptual disturbance   Behavior:   Cooperative   Psychomotor: Slowed   Speech:   Slow and Soft   Eye Contact: good   Orientation:  oriented to person, place, time, and general circumstances   Judgment & Insight:  normal insight and judgment       Risk Assessment:  Current Suicide Risk:  no suicidal ideation  Current Homicide Risk:  no homocidal ideation  Protective Factors: Support from family; Congregational convictions      Diagnosis:     Diagnosis Orders   1. Posttraumatic stress disorder     2. Major depressive disorder, single episode, moderate (HCC)         Psychotherapy Intervention:  Continued behavioral treatment for insomnia. He has followed prior recommendations to:  1) expose self to natural light during the day; 2) be physically active during the day; 3) do relaxation activity at night for sleep. He continues to be impacted by nightmares. Cognitive stimulation (crossword puzzle, etc) causes increased eye strain/pain. Advised he try light physical activity as part of wind-down period, combined with guided relaxation.     He has followed recommendations to have contact by phone/text with friends or relatives. Has regular phone contact with friend Carolina Mcallister, who is a  and provides spiritual support. Treatment plan goal(s) addressed: To reduce the frequency and severity of depressed mood, to improve the quality and quantity of sleep, and promote pain coping techniques. Homework/recommendations:   See above    Progress since intake/last session:  He has followed recommendations for sleep. Can get 2-4 hours at a time (every other night). He is taking Belsomra, which he stated is helping. Patient Response to Plan/Recommendations:  some progress apparent      Electronically signed by Thee Remy, PhD    This note will not be viewable in [employer] for the following reason(s). This is a Psychotherapy Note.

## 2021-03-04 ENCOUNTER — VIRTUAL VISIT (OUTPATIENT)
Dept: PSYCHOLOGY | Age: 46
End: 2021-03-04
Payer: COMMERCIAL

## 2021-03-04 DIAGNOSIS — F32.1 MAJOR DEPRESSIVE DISORDER, SINGLE EPISODE, MODERATE (HCC): ICD-10-CM

## 2021-03-04 DIAGNOSIS — F43.10 POSTTRAUMATIC STRESS DISORDER: Primary | ICD-10-CM

## 2021-03-04 PROCEDURE — 90832 PSYTX W PT 30 MINUTES: CPT | Performed by: PSYCHOLOGIST

## 2021-03-04 NOTE — PROGRESS NOTES
[address] [facility]  3/4/2021  12:17 PM    Time Start: 10:00 a.m. Time End:  10:25 a.m. Date of Service:  3/4/2021     TeleMedicine Patient Consent    This visit was performed as a virtual video visit using a synchronous, two-way, audio-video telehealth technology platform. This visit was performed virtually during the 2165-6223 public health crisis and COVID-19 pandemic to reduce the risk of exposure to COVID-19 to the patient and/or provider. Patient identification was verified at the start of the visit, including the patient's telephone number and physical location. I discussed with the patient the nature of our telehealth visits, that:      There are potential benefits and risks of video-conferencing (e.g. limits to patient confidentiality) that differ from in-person sessions.  Confidentiality still applies for telepsychology services, and nobody will record the session without the permission from the others person(s).  We agree to use the video-conferencing platform selected for our virtual sessions, and the psychologist will explain how to use it.  You need to use a webcam or smartphone during the session.  It is important to be in a quiet, private space that is free of distractions (including cell phone or other devices) during the session.  It is important to use a secure internet connection rather than public/free Wi-Fi.  We need a back-up plan (e.g., phone number where you can be reached) to restart the session or to reschedule it, in the event of technical problems.  We need a safety plan that includes at least one emergency contact and the closest ER to your location, in the event of a crisis situation.  If you are not an adult, we need the permission of your parent or legal guardian (and their contact information) for you to participate in telepsychology sessions.    As your psychologist, I may determine that due to certain circumstances, telepsychology is no longer appropriate and that we should resume our sessions in-person. Patient does agree to proceed with telemedicine consultation. Patient's location: [address]     Provider location:  [address] [place] [address]  [facility]  [address] [place] [address] Dr lomeli to all Video Visits*      Significant Stressors/Changes in Circumstances:   Continued insomnia and chronic pain. Mental Status:    Appearance: casually dressed   Affect:  restricted   Attitude: Cooperative   Mood:   Dysphoric   Thought Process:  Linear and goal directed   Delusions: no evidence of delusions   Perceptions: No perceptual disturbance   Behavior:   Cooperative   Psychomotor: Slowed   Speech:   Slow and Soft   Eye Contact: good   Orientation:  oriented to person, place, time, and general circumstances   Judgment & Insight:  normal insight and judgment       Risk Assessment:  Current Suicide Risk:  no suicidal ideation  Current Homicide Risk:  no homocidal ideation  Protective Factors: Support from family; Adventism convictions      Diagnosis:     Diagnosis Orders   1. Posttraumatic stress disorder     2. Major depressive disorder, single episode, moderate (HCC)         Psychotherapy Intervention:  He continues to report chronic pain, fatigue and depression. He is prescribed Belsomra by Antolin Maradiaga, MSN, PMHCNS-BC at [facility]. He states it is the most effective medication he has had, but can only get 2-4 hours of sleep every other night. Reviewed strategies to improve sleep:  1) expose self to natural light during the day; 2) be physically active during the day; 3) do relaxation activity at night for sleep. He continues to be impacted by nightmares. He rarely leaves home. COVID restrictions have significantly impacted this pattern. He has had COVID; and is concerned due to his cardiomyopathy.  Discussed option to discuss with his cardiologist at his next appt on March 11 what COVID precautions he should be taking, and if he is a candidate for the vaccine. Isolation and lack of activity has impacted his depression and progress. Advised that he work on ideas to get out of the house. [place] and local walks are not an option. Brainstormed other options (e.g., walking in park or cemetary). He remains in contact with a close friend, Racquel Bosch, who has been a good spiritual support. He has a psychological ROSIE today. Treatment plan goal(s) addressed: To reduce the frequency and severity of depressed mood, to improve the quality and quantity of sleep, and promote pain coping techniques. Homework/recommendations:   See above    Progress since intake/last session:  Maintained    Patient Response to Plan/Recommendations:  some progress apparent      Electronically signed by Jeanann Dancer, PhD    This note will not be viewable in [employer] for the following reason(s). This is a Psychotherapy Note.

## 2021-03-22 ENCOUNTER — VIRTUAL VISIT (OUTPATIENT)
Dept: PSYCHOLOGY | Age: 46
End: 2021-03-22
Payer: COMMERCIAL

## 2021-03-22 DIAGNOSIS — F32.1 MAJOR DEPRESSIVE DISORDER, SINGLE EPISODE, MODERATE (HCC): ICD-10-CM

## 2021-03-22 DIAGNOSIS — F43.10 POSTTRAUMATIC STRESS DISORDER: Primary | ICD-10-CM

## 2021-03-22 PROCEDURE — 90832 PSYTX W PT 30 MINUTES: CPT | Performed by: PSYCHOLOGIST
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
## 2021-04-12 ENCOUNTER — VIRTUAL VISIT (OUTPATIENT)
Dept: PSYCHOLOGY | Age: 46
End: 2021-04-12
Payer: COMMERCIAL

## 2021-04-12 DIAGNOSIS — F43.10 POSTTRAUMATIC STRESS DISORDER: Primary | ICD-10-CM

## 2021-04-12 DIAGNOSIS — F32.1 MAJOR DEPRESSIVE DISORDER, SINGLE EPISODE, MODERATE (HCC): ICD-10-CM

## 2021-04-12 PROCEDURE — 90832 PSYTX W PT 30 MINUTES: CPT | Performed by: PSYCHOLOGIST

## 2021-04-12 NOTE — PROGRESS NOTES
[address] [place] [address]  4/12/2021  8:45 AM    Time Start: 2:00pm   Time End: 2:30  pm  Date of Service:  4/12/2021     TeleMedicine Patient Consent    This visit was performed as a virtual video visit using a synchronous, two-way, audio-video telehealth technology platform. This visit was performed virtually during the 3946-3842 public health crisis and COVID-19 pandemic to reduce the risk of exposure to COVID-19 to the patient and/or provider. Patient identification was verified at the start of the visit, including the patient's telephone number and physical location. I discussed with the patient the nature of our telehealth visits, that:      There are potential benefits and risks of video-conferencing (e.g. limits to patient confidentiality) that differ from in-person sessions.  Confidentiality still applies for telepsychology services, and nobody will record the session without the permission from the others person(s).  We agree to use the video-conferencing platform selected for our virtual sessions, and the psychologist will explain how to use it.  You need to use a webcam or smartphone during the session.  It is important to be in a quiet, private space that is free of distractions (including cell phone or other devices) during the session.  It is important to use a secure internet connection rather than public/free Wi-Fi.  We need a back-up plan (e.g., phone number where you can be reached) to restart the session or to reschedule it, in the event of technical problems.  We need a safety plan that includes at least one emergency contact and the closest ER to your location, in the event of a crisis situation.  If you are not an adult, we need the permission of your parent or legal guardian (and their contact information) for you to participate in telepsychology sessions.    As your psychologist, I may determine that due to certain one is here to harm me; 2) God will get me through; 3) trauma causes the panic attacks; 4) most people are here to enjoy the park as well; 5) this is a chance to be successful. Difficulty getting out of the house has been compounded due to COVID, poorly managed pain, and living in a dangerous neighborhood. Treatment plan goal(s) addressed: To reduce the frequency and severity of depressed mood, to improve the quality and quantity of sleep, and promote pain coping techniques. Homework/recommendations:   Complete treatment update measures    Progress since intake/last session:  Mild progress, ready to revise treatment plan    Electronically signed by Raheem Bustamante, PhD    This note will not be viewable in [employer] for the following reason(s). This is a Psychotherapy Note.

## 2021-05-03 ENCOUNTER — VIRTUAL VISIT (OUTPATIENT)
Dept: PSYCHOLOGY | Age: 46
End: 2021-05-03
Payer: COMMERCIAL

## 2021-05-03 DIAGNOSIS — F43.10 POSTTRAUMATIC STRESS DISORDER: Primary | ICD-10-CM

## 2021-05-03 DIAGNOSIS — F32.1 MAJOR DEPRESSIVE DISORDER, SINGLE EPISODE, MODERATE (HCC): ICD-10-CM

## 2021-05-03 PROCEDURE — 90832 PSYTX W PT 30 MINUTES: CPT | Performed by: PSYCHOLOGIST

## 2021-05-03 NOTE — PROGRESS NOTES
[address] [place] [address]  5/3/2021  9:44 AM    Time Start: 12:30pm   Time End:  1:00 pm  Date of Service:  5/3/2021     TeleMedicine Patient Consent    This visit was performed as a virtual video visit using a synchronous, two-way, audio-video telehealth technology platform. This visit was performed virtually during the 1730-9195 public health crisis and COVID-19 pandemic to reduce the risk of exposure to COVID-19 to the patient and/or provider. Patient identification was verified at the start of the visit, including the patient's telephone number and physical location. I discussed with the patient the nature of our telehealth visits, that:      There are potential benefits and risks of video-conferencing (e.g. limits to patient confidentiality) that differ from in-person sessions.  Confidentiality still applies for telepsychology services, and nobody will record the session without the permission from the others person(s).  We agree to use the video-conferencing platform selected for our virtual sessions, and the psychologist will explain how to use it.  You need to use a webcam or smartphone during the session.  It is important to be in a quiet, private space that is free of distractions (including cell phone or other devices) during the session.  It is important to use a secure internet connection rather than public/free Wi-Fi.  We need a back-up plan (e.g., phone number where you can be reached) to restart the session or to reschedule it, in the event of technical problems.  We need a safety plan that includes at least one emergency contact and the closest ER to your location, in the event of a crisis situation.  If you are not an adult, we need the permission of your parent or legal guardian (and their contact information) for you to participate in telepsychology sessions.    As your psychologist, I may determine that due to certain circumstances, telepsychology is no longer appropriate and that we should resume our sessions in-person. Patient does agree to proceed with telemedicine consultation. Patient's location: [place] [id number]    Provider location:  [address] [place] [address] 64  [id number] [facility] modifier to all Video Visits*      Interval History:    He has had three relatives with major health issues/crises recently that his wife is heavily involved with care-giving (father in law stent placed; two aunts with cancer - one lymphoma and one advanced ovarian cancer). He is concerned for his wife's well-being; and also concerned about extended family reactions, as he reported that they historically have a high degree of conflict in such situations. Due to his pain, depression, and PTSD he has limited tolerance for family conflicts and interacting with others in general. Stated he has not slept in 4 days due to the stress. Stated pain has been increased, cited damp/humid weather conditions today. Mental Status:    Appearance: casually dressed   Affect:  restricted   Attitude: Cooperative   Mood:   Dysphoric   Thought Process:  Linear and goal directed   Delusions: no evidence of delusions   Perceptions: No perceptual disturbance   Behavior:   Cooperative   Psychomotor: Slowed   Speech:   Slow and Soft   Eye Contact: good   Orientation:  oriented to person, place, time, and general circumstances   Judgment & Insight:  normal insight and judgment       Risk Assessment:  Current Suicide Risk:  no suicidal ideation  Current Homicide Risk:  no homocidal ideation  Protective Factors: Support from family; Presybeterian convictions      Diagnosis:     Diagnosis Orders   1. Posttraumatic stress disorder     2.  Major depressive disorder, single episode, moderate (HCC)         Psychotherapy Intervention:  Review of Symptoms, Interpersonal Therapy, Cognitive Behavioral Therapy

## 2021-05-17 ENCOUNTER — VIRTUAL VISIT (OUTPATIENT)
Dept: PSYCHOLOGY | Age: 46
End: 2021-05-17
Payer: COMMERCIAL

## 2021-05-17 DIAGNOSIS — F32.1 MAJOR DEPRESSIVE DISORDER, SINGLE EPISODE, MODERATE (HCC): ICD-10-CM

## 2021-05-17 DIAGNOSIS — F43.10 POSTTRAUMATIC STRESS DISORDER: Primary | ICD-10-CM

## 2021-05-17 PROCEDURE — 90837 PSYTX W PT 60 MINUTES: CPT | Performed by: PSYCHOLOGIST

## 2021-05-17 ASSESSMENT — PATIENT HEALTH QUESTIONNAIRE - PHQ9
9. THOUGHTS THAT YOU WOULD BE BETTER OFF DEAD, OR OF HURTING YOURSELF: 1
3. TROUBLE FALLING OR STAYING ASLEEP: 2
SUM OF ALL RESPONSES TO PHQ QUESTIONS 1-9: 15
7. TROUBLE CONCENTRATING ON THINGS, SUCH AS READING THE NEWSPAPER OR WATCHING TELEVISION: 3
4. FEELING TIRED OR HAVING LITTLE ENERGY: 2
SUM OF ALL RESPONSES TO PHQ9 QUESTIONS 1 & 2: 6
SUM OF ALL RESPONSES TO PHQ QUESTIONS 1-9: 16
2. FEELING DOWN, DEPRESSED OR HOPELESS: 3
1. LITTLE INTEREST OR PLEASURE IN DOING THINGS: 3

## 2021-05-17 ASSESSMENT — ANXIETY QUESTIONNAIRES
1. FEELING NERVOUS, ANXIOUS, OR ON EDGE: 2
5. BEING SO RESTLESS THAT IT IS HARD TO SIT STILL: 1
GAD7 TOTAL SCORE: 15
3. WORRYING TOO MUCH ABOUT DIFFERENT THINGS: 3
7. FEELING AFRAID AS IF SOMETHING AWFUL MIGHT HAPPEN: 2
2. NOT BEING ABLE TO STOP OR CONTROL WORRYING: 3

## 2021-05-17 NOTE — PROGRESS NOTES
circumstances, telepsychology is no longer appropriate and that we should resume our sessions in-person. Patient does agree to proceed with telemedicine consultation. Patient's location: [address]     [facility]  [facility]  [address] modifier to all Video Visits*      Interval History:      His wife has 2 aunts who are very ill; and she is involved in their caregiving. Objective Testing:  PHQ - 9 = 16  CHIVO - 7 = 15  PTSD checklist = 48    Mental Status:    Appearance: casually dressed   Affect:  restricted   Attitude: Cooperative   Mood:   Dysphoric   Thought Process:  Linear and goal directed   Delusions: no evidence of delusions   Perceptions: No perceptual disturbance   Behavior:   Cooperative   Psychomotor: Slowed   Speech:   Slow and Soft   Eye Contact: good   Orientation:  oriented to person, place, time, and general circumstances   Judgment & Insight:  normal insight and judgment       Risk Assessment:  Current Suicide Risk:  no suicidal ideation  Current Homicide Risk:  no homocidal ideation  Protective Factors: Support from family; [employer] convictions      Diagnosis:     Diagnosis Orders   1. Posttraumatic stress disorder     2. Major depressive disorder, single episode, moderate (HCC)         Psychotherapy Intervention:  Review of Symptoms, Interpersonal Therapy, Cognitive Behavioral Therapy    He reported sleep is improving. He is sleeping 6 to 7 hours, every other night. He is ready to start planning trips out of the house. He stated he has not left the house in 1 year. Discussed plan to go for a drive in a local park with his wife. Discussed coping statements to manage PTSD symptoms that cause fear of being out in public. Revised treatment plan, see below. Treatment plan goal(s) addressed:   To reduce the frequency and severity of depressed mood, to improve the quality and quantity of sleep, and promote pain coping techniques. Homework/recommendations:  Begin exposure therapy for trips out of the house. Progress since intake/last session:  Mild progress with improved sleep. Treatment plan review:  Updated today, 5/17/21    Problem:  Posttraumatic Stress  Description: Nightmares, avoidance of public places, interpersonal difficulty, insomnia, daytime flashbacks, hypervigilance, heightened startle response. Treatment Goal: To decrease the frequency and severity of trauma symptoms. Objective outcome measure(s): PTSD checklist = 48  Other behavioral and/or measurable objective(s): Frequency of trips outside of the home. Problem:  Depression  Description: Depressed mood, anhedonia, guilt/shame (mild), irritability. Treatment Goal:  To decrease the frequency and severity of depression symptoms. Objective outcome measure(s):  PHQ-9 = 16  Other behavioral and/or measurable objective(s):       Problem:  Insomnia  Description:  Difficulty staying asleep due to nightmares and chronic pain. Treatment Goal:  To improve the quality of sleep and increase number of hours of sleep. Objective outcome measure(s):  Number of hours of sleep per night    Problem:  Coping with Chronic Pain  Description:  Chronic head and neck pain due to work-related injury  Treatment Goal:  To improve pain coping techniques. Objective outcome measure(s): Pain Disability Index    Psychotherapy Intervention(s):  Cognitive Behavioral Therapy   Frequency of psychotherapy: 2 sessions per month  Other professional, community, and/or natural supports involved in treatment: Gilford Pacer, MSN, PMNS-BC ([facility]), Wife Maricruz Castro    Targeted indices of improvement:   Mild Progress Moderate Progress Significant Progress     Description of Progress:   Continue to sleep every other night (6-7 hours). Add 1 hour of sleep on alternate nights. Get out of the house at least once per month.  Start with park

## 2021-06-07 ENCOUNTER — VIRTUAL VISIT (OUTPATIENT)
Dept: PSYCHOLOGY | Age: 46
End: 2021-06-07
Payer: COMMERCIAL

## 2021-06-07 DIAGNOSIS — F32.1 MAJOR DEPRESSIVE DISORDER, SINGLE EPISODE, MODERATE (HCC): ICD-10-CM

## 2021-06-07 DIAGNOSIS — F43.10 POSTTRAUMATIC STRESS DISORDER: Primary | ICD-10-CM

## 2021-06-07 PROCEDURE — 90832 PSYTX W PT 30 MINUTES: CPT | Performed by: PSYCHOLOGIST
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
## 2021-06-29 ENCOUNTER — VIRTUAL VISIT (OUTPATIENT)
Dept: PSYCHOLOGY | Age: 46
End: 2021-06-29
Payer: COMMERCIAL

## 2021-06-29 DIAGNOSIS — F43.10 POSTTRAUMATIC STRESS DISORDER: Primary | ICD-10-CM

## 2021-06-29 DIAGNOSIS — F32.1 MAJOR DEPRESSIVE DISORDER, SINGLE EPISODE, MODERATE (HCC): ICD-10-CM

## 2021-06-29 PROCEDURE — 90832 PSYTX W PT 30 MINUTES: CPT | Performed by: PSYCHOLOGIST

## 2021-07-12 ENCOUNTER — VIRTUAL VISIT (OUTPATIENT)
Dept: PSYCHOLOGY | Age: 46
End: 2021-07-12
Payer: COMMERCIAL

## 2021-07-12 DIAGNOSIS — F32.1 MAJOR DEPRESSIVE DISORDER, SINGLE EPISODE, MODERATE (HCC): ICD-10-CM

## 2021-07-12 DIAGNOSIS — F43.10 POSTTRAUMATIC STRESS DISORDER: Primary | ICD-10-CM

## 2021-07-12 PROCEDURE — 90832 PSYTX W PT 30 MINUTES: CPT | Performed by: PSYCHOLOGIST

## 2021-07-12 NOTE — PROGRESS NOTES
[address] [place] [address]  7/13/2021  9:39 AM    Time Start: 1:30pm   Time End: 2:00pm  Date of Service:  7/12/2021     TeleMedicine Patient Consent    This visit was performed as a virtual video visit using a synchronous, two-way, audio-video telehealth technology platform. This visit was performed virtually during the 2193-5933 public health crisis and COVID-19 pandemic to reduce the risk of exposure to COVID-19 to the patient and/or provider. Patient identification was verified at the start of the visit, including the patient's telephone number and physical location. I discussed with the patient the nature of our telehealth visits, that:      There are potential benefits and risks of video-conferencing (e.g. limits to patient confidentiality) that differ from in-person sessions.  Confidentiality still applies for telepsychology services, and nobody will record the session without the permission from the others person(s).  We agree to use the video-conferencing platform selected for our virtual sessions, and the psychologist will explain how to use it.  You need to use a webcam or smartphone during the session.  It is important to be in a quiet, private space that is free of distractions (including cell phone or other devices) during the session.  It is important to use a secure internet connection rather than public/free Wi-Fi.  We need a back-up plan (e.g., phone number where you can be reached) to restart the session or to reschedule it, in the event of technical problems.  We need a safety plan that includes at least one emergency contact and the closest ER to your location, in the event of a crisis situation.  If you are not an adult, we need the permission of your parent or legal guardian (and their contact information) for you to participate in telepsychology sessions.    As your psychologist, I may determine that due to certain circumstances, telepsychology is no longer appropriate and that we should resume our sessions in-person. Patient does agree to proceed with telemedicine consultation. Patient's location: [place] [address] [address] [facility]     Provider location:  [address] [facility]  [email or web address] modifier to all Video Visits*      Interval History:      Son of a cousin, age 25, was murdered this past week. They were not as close as some of his other relatives that have been murdered, but this is nevertheless yet another tragic circumstance he has been having to cope with. Mental Status:    Appearance: casually dressed   Affect:  restricted   Attitude: Cooperative   Mood:   Dysphoric   Thought Process:  Linear and goal directed   Delusions: no evidence of delusions   Perceptions: No perceptual disturbance   Behavior:   Cooperative   Psychomotor: Slowed   Speech:   Slow and Soft   Eye Contact: good   Orientation:  oriented to person, place, time, and general circumstances   Judgment & Insight:  normal insight and judgment       Risk Assessment:  Current Suicide Risk:  no suicidal ideation  Current Homicide Risk:  no homocidal ideation  Protective Factors: Support from family; Mandaeism convictions      Diagnosis:     Diagnosis Orders   1. Posttraumatic stress disorder     2. Major depressive disorder, single episode, moderate (HCC)         Psychotherapy Intervention:  Review of Symptoms,  Cognitive Behavioral Therapy    He has made good progress in the last few weeks. He did leave the house and attempted to attend a family picnic. However, he felt overwhelmed with the amount of people present and had to leave after 10 minutes. Discussed how to reset goals to leave the house. Advised that he pick a situation in which there are minimal other people and over which she has good control. For example, a drive to the park with his wife.   He also has been more involved and engaged with his other strong source of support, his friend Kareem De La Fuente who is a Gnosticist . He listens and to Gnosticist services and text messages with him on spiritual matters. Treatment plan goal(s) addressed: To reduce the frequency and severity of depressed mood, to improve the quality and quantity of sleep, and promote pain coping techniques. Homework/recommendations: See above    Progress since intake/last session:  Moderate progress - very good improvement with leaving the house. Treatment plan review:  Treatment plan active, updated 5/17/21    Plan:  Follow up in 2 weeks.       Electronically signed by Polo Jeffers, PhD

## 2021-07-29 ENCOUNTER — VIRTUAL VISIT (OUTPATIENT)
Dept: PSYCHOLOGY | Age: 46
End: 2021-07-29
Payer: COMMERCIAL

## 2021-07-29 DIAGNOSIS — F32.1 MAJOR DEPRESSIVE DISORDER, SINGLE EPISODE, MODERATE (HCC): ICD-10-CM

## 2021-07-29 DIAGNOSIS — F43.10 POSTTRAUMATIC STRESS DISORDER: Primary | ICD-10-CM

## 2021-07-29 PROCEDURE — 90832 PSYTX W PT 30 MINUTES: CPT | Performed by: PSYCHOLOGIST

## 2021-07-29 NOTE — PROGRESS NOTES
[address] [place] [address]  7/30/2021  11:17 AM    Time Start: 1:00pm   Time End: 1:20pm  Date of Service:  7/29/2021     TeleMedicine Patient Consent    This visit was performed as a virtual video visit using a synchronous, two-way, audio-video telehealth technology platform. This visit was performed virtually during the 3892-5080 public health crisis and COVID-19 pandemic to reduce the risk of exposure to COVID-19 to the patient and/or provider. Patient identification was verified at the start of the visit, including the patient's telephone number and physical location. I discussed with the patient the nature of our telehealth visits, that:      There are potential benefits and risks of video-conferencing (e.g. limits to patient confidentiality) that differ from in-person sessions.  Confidentiality still applies for telepsychology services, and nobody will record the session without the permission from the others person(s).  We agree to use the video-conferencing platform selected for our virtual sessions, and the psychologist will explain how to use it.  You need to use a webcam or smartphone during the session.  It is important to be in a quiet, private space that is free of distractions (including cell phone or other devices) during the session.  It is important to use a secure internet connection rather than public/free Wi-Fi.  We need a back-up plan (e.g., phone number where you can be reached) to restart the session or to reschedule it, in the event of technical problems.  We need a safety plan that includes at least one emergency contact and the closest ER to your location, in the event of a crisis situation.  If you are not an adult, we need the permission of your parent or legal guardian (and their contact information) for you to participate in telepsychology sessions.    As your psychologist, I may determine that due to certain circumstances, telepsychology is no longer appropriate and that we should resume our sessions in-person. Patient does agree to proceed with telemedicine consultation. Patient's location: [place] [address] [address] [facility]     Provider location:  [facility] 64  [address] [facility] modifier to all Video Visits*      Interval History:    He continues to grieve the murder of his cousin a few months ago. Mental Status:    Appearance: casually dressed   Affect:  restricted   Attitude: Cooperative   Mood:   Dysphoric   Thought Process:  Linear and goal directed   Delusions: no evidence of delusions   Perceptions: No perceptual disturbance   Behavior:   Cooperative   Psychomotor: Slowed   Speech:   Slow and Soft   Eye Contact: good   Orientation:  oriented to person, place, time, and general circumstances   Judgment & Insight:  normal insight and judgment       Risk Assessment:  Current Suicide Risk:  no suicidal ideation  Current Homicide Risk:  no homocidal ideation  Protective Factors: Support from family; Lutheran convictions      Diagnosis:     Diagnosis Orders   1. Posttraumatic stress disorder     2. Major depressive disorder, single episode, moderate (HCC)         Psychotherapy Intervention:  Review of Symptoms,  Cognitive Behavioral Therapy    He has made good progress in the last few weeks. He stated that he is working on staying out of his bedroom 1 to 2 hours/day, which is an improvement over what it had been. Also, he is following the sleep plan. He only attempts to sleep every other night, but is now able to get a few restful hours with this plan. He has also planned with his wife to go on a drive to the park, which they had not been able to do last week, due to her being out of town for work. All of these behaviors are improvements over last session. He also continues to increase amount of time spent praying.   Advised that he incorporate prayer into his bedtime routine as he finds it relaxing and this may help to achieve more restful sleep. Reviewed cognitive coping strategies for leaving the house to the park: (\"I am not in danger, I am safe in the car, The chances of being attacked are low. \"). Treatment plan goal(s) addressed: To reduce the frequency and severity of depressed mood, to improve the quality and quantity of sleep, and promote pain coping techniques. Homework/recommendations: See above    Progress since intake/last session:  Moderate progress    Treatment plan review:  Treatment plan active, updated 5/17/21    Plan:  Follow up in 2 weeks.       Electronically signed by Any Cabrera, PhD

## 2021-08-12 ENCOUNTER — VIRTUAL VISIT (OUTPATIENT)
Dept: PSYCHOLOGY | Age: 46
End: 2021-08-12
Payer: COMMERCIAL

## 2021-08-12 DIAGNOSIS — F43.10 POSTTRAUMATIC STRESS DISORDER: ICD-10-CM

## 2021-08-12 DIAGNOSIS — F32.1 MAJOR DEPRESSIVE DISORDER, SINGLE EPISODE, MODERATE (HCC): Primary | ICD-10-CM

## 2021-08-12 PROCEDURE — 90832 PSYTX W PT 30 MINUTES: CPT | Performed by: PSYCHOLOGIST

## 2021-08-12 NOTE — PROGRESS NOTES
[address] [facility]  8/12/2021  9:28 AM    Time Start: 11:30am  Time End: 12:00pm  Date of Service:  8/12/2021     TeleMedicine Patient Consent    This visit was performed as a virtual video visit using a synchronous, two-way, audio-video telehealth technology platform. This visit was performed virtually during the 9898-6109 public health crisis and COVID-19 pandemic to reduce the risk of exposure to COVID-19 to the patient and/or provider. Patient identification was verified at the start of the visit, including the patient's telephone number and physical location. I discussed with the patient the nature of our telehealth visits, that:      There are potential benefits and risks of video-conferencing (e.g. limits to patient confidentiality) that differ from in-person sessions.  Confidentiality still applies for telepsychology services, and nobody will record the session without the permission from the others person(s).  We agree to use the video-conferencing platform selected for our virtual sessions, and the psychologist will explain how to use it.  You need to use a webcam or smartphone during the session.  It is important to be in a quiet, private space that is free of distractions (including cell phone or other devices) during the session.  It is important to use a secure internet connection rather than public/free Wi-Fi.  We need a back-up plan (e.g., phone number where you can be reached) to restart the session or to reschedule it, in the event of technical problems.  We need a safety plan that includes at least one emergency contact and the closest ER to your location, in the event of a crisis situation.  If you are not an adult, we need the permission of your parent or legal guardian (and their contact information) for you to participate in telepsychology sessions.    As your psychologist, I may determine that due to certain circumstances, telepsychology is no longer appropriate and that we should resume our sessions in-person. Patient does agree to proceed with telemedicine consultation. Patient's location: [address]    Provider location:  [address] [place], [place] [address]peg[facility]  [address] [place] [address] Dr lomeli to all Video Visits*      Interval History:    Continues to experience chronic pain and severe PTSD. Mostly homebound due to these symptoms. Mental Status:    Appearance: casually dressed   Affect:  restricted   Attitude: Cooperative   Mood:   Dysphoric   Thought Process:  Linear and goal directed   Delusions: no evidence of delusions   Perceptions: No perceptual disturbance   Behavior:   Cooperative   Psychomotor: Slowed   Speech:   Slow and Soft   Eye Contact: good   Orientation:  oriented to person, place, time, and general circumstances   Judgment & Insight:  normal insight and judgment       Risk Assessment:  Current Suicide Risk:  no suicidal ideation  Current Homicide Risk:  no homocidal ideation  Protective Factors: Support from family; Muslim convictions      Diagnosis:     Diagnosis Orders   1. Major depressive disorder, single episode, moderate ([address]     2. Posttraumatic stress disorder         Psychotherapy Intervention:  Review of Symptoms,  Cognitive Behavioral Therapy      He continues to make good progress. He followed recommendations from last session to attempt a drive out of the house with his wife. They went for a drive in a local park. He reported extreme anxiety even leaving the house, but was able to do so. He rated anxiety as 10/10. Stated he was able to make it about 5 to 6 minutes in the park before having to go home due to anxiety. He was only able to calm back down once he got in the house (after around 20 minutes). Reviewed this exposure therapy practice in detail.   Emphasized the importance of continuing the exposure

## 2021-08-30 ENCOUNTER — VIRTUAL VISIT (OUTPATIENT)
Dept: PSYCHOLOGY | Age: 46
End: 2021-08-30
Payer: COMMERCIAL

## 2021-08-30 DIAGNOSIS — F43.10 POSTTRAUMATIC STRESS DISORDER: ICD-10-CM

## 2021-08-30 DIAGNOSIS — F32.1 MAJOR DEPRESSIVE DISORDER, SINGLE EPISODE, MODERATE (HCC): Primary | ICD-10-CM

## 2021-08-30 PROCEDURE — 90832 PSYTX W PT 30 MINUTES: CPT | Performed by: PSYCHOLOGIST
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
## 2021-09-16 ENCOUNTER — VIRTUAL VISIT (OUTPATIENT)
Dept: PSYCHOLOGY | Age: 46
End: 2021-09-16
Payer: COMMERCIAL

## 2021-09-16 DIAGNOSIS — F43.10 POSTTRAUMATIC STRESS DISORDER: Primary | ICD-10-CM

## 2021-09-16 DIAGNOSIS — F32.1 MAJOR DEPRESSIVE DISORDER, SINGLE EPISODE, MODERATE (HCC): ICD-10-CM

## 2021-09-16 PROCEDURE — 90832 PSYTX W PT 30 MINUTES: CPT | Performed by: PSYCHOLOGIST
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
## 2021-10-04 ENCOUNTER — VIRTUAL VISIT (OUTPATIENT)
Dept: PSYCHOLOGY | Age: 46
End: 2021-10-04
Payer: COMMERCIAL

## 2021-10-04 DIAGNOSIS — F43.10 POSTTRAUMATIC STRESS DISORDER: Primary | ICD-10-CM

## 2021-10-04 DIAGNOSIS — F32.1 MAJOR DEPRESSIVE DISORDER, SINGLE EPISODE, MODERATE (HCC): ICD-10-CM

## 2021-10-04 PROCEDURE — 90832 PSYTX W PT 30 MINUTES: CPT | Performed by: PSYCHOLOGIST

## 2021-10-04 NOTE — PROGRESS NOTES
[address] [facility]  10/5/2021  8:43 AM    Time Start: 11:30am  Time End: 12:00pm  Date of Service:  10/4/2021     TeleMedicine Patient Consent    This visit was performed as a virtual video visit using a synchronous, two-way, audio-video telehealth technology platform. This visit was performed virtually during the 1389-0792 public health crisis and COVID-19 pandemic to reduce the risk of exposure to COVID-19 to the patient and/or provider. Patient identification was verified at the start of the visit, including the patient's telephone number and physical location. I discussed with the patient the nature of our telehealth visits, that:      There are potential benefits and risks of video-conferencing (e.g. limits to patient confidentiality) that differ from in-person sessions.  Confidentiality still applies for telepsychology services, and nobody will record the session without the permission from the others person(s).  We agree to use the video-conferencing platform selected for our virtual sessions, and the psychologist will explain how to use it.  You need to use a webcam or smartphone during the session.  It is important to be in a quiet, private space that is free of distractions (including cell phone or other devices) during the session.  It is important to use a secure internet connection rather than public/free Wi-Fi.  We need a back-up plan (e.g., phone number where you can be reached) to restart the session or to reschedule it, in the event of technical problems.  We need a safety plan that includes at least one emergency contact and the closest ER to your location, in the event of a crisis situation.  If you are not an adult, we need the permission of your parent or legal guardian (and their contact information) for you to participate in telepsychology sessions.    As your psychologist, I may determine that due to certain circumstances, telepsychology is no longer appropriate and that we should resume our sessions in-person. Patient does agree to proceed with telemedicine consultation. Patient's location: [address]     Provider location:  [address] [place] [address]  [facility] [address]  [address] [place] [address] Dr lomeli to all Video Visits*      Interval History:    Continues to experience chronic pain and severe PTSD. Mostly homebound due to these symptoms. A friend recently  of COVID-23 at age 54. Two aunts of his wife are currently in hospice care due to cancer. He also has dental pain due to a hole in his front tooth, which he plans to have addressed today or tomorrow. Working on getting dentist appointment. Mental Status:    Appearance: casually dressed   Affect:  restricted   Attitude: Cooperative   Mood:   Dysphoric   Thought Process:  Linear and goal directed   Delusions: no evidence of delusions   Perceptions: No perceptual disturbance   Behavior:   Cooperative; fatigued   Psychomotor: Slowed   Speech:   Slow and Soft   Eye Contact: good   Orientation:  oriented to person, place, time, and general circumstances   Judgment & Insight:  normal insight and judgment       Risk Assessment:  Current Suicide Risk:  no suicidal ideation  Current Homicide Risk:  no homocidal ideation  Protective Factors: Support from family; Jewish convictions      Diagnosis:     Diagnosis Orders   1. Posttraumatic stress disorder     2. Major depressive disorder, single episode, moderate (HCC)         Psychotherapy Intervention:  Review of Symptoms,  Cognitive Behavioral Therapy    He continues to experience deaths of friends, and now the anticipated deaths of two family members. These repeated deaths continue to make progress difficult. He feels emotionally numb when he experiences these situations. Discussed the role that he can play, which is to pray for these individuals.   He may have the capacity to send text messages but his anxiety prevents much in terms of phone or face-to-face conversations. With his dental issue of a hole in his tooth, he has no choice but to leave his house to see his dentist.  He states that he has not seen a dentist since before his work related injury which was in March 2019. Discussed how to manage his anxiety leaving the house to go to the dentist:  1) his wife has explained to the dentist office that he has PTSD. She is trying to arrange for him to not have to sit in the waiting room and go straight into an exam room. 2) reviewed cognitive coping statements, advised him to focus on telling himself that he is safe going to the dentist office and that PTSD skews his thinking about safety  3) politely decline any \"small talk\" with dentist office staff  4) make notes about successes in this trip out in order to build upon for further work on getting out of the house. Treatment plan goal(s) addressed: To reduce the frequency and severity of depressed mood, to improve the quality and quantity of sleep, and promote pain coping techniques. Homework/recommendations: See above    Progress since intake/last session:  Stable    Treatment plan review:  Treatment plan active, updated 5/17/21    Plan:  Follow up in 2 weeks.       Electronically signed by Claudean Pellet, PhD

## 2021-10-19 ENCOUNTER — VIRTUAL VISIT (OUTPATIENT)
Dept: PSYCHOLOGY | Age: 46
End: 2021-10-19
Payer: COMMERCIAL

## 2021-10-19 DIAGNOSIS — F43.10 POSTTRAUMATIC STRESS DISORDER: Primary | ICD-10-CM

## 2021-10-19 DIAGNOSIS — F32.1 MAJOR DEPRESSIVE DISORDER, SINGLE EPISODE, MODERATE (HCC): ICD-10-CM

## 2021-10-19 PROCEDURE — 90832 PSYTX W PT 30 MINUTES: CPT | Performed by: PSYCHOLOGIST

## 2021-10-19 NOTE — PROGRESS NOTES
[address] [place] [address]  10/19/2021  12:32 PM    Time Start: 11:30am  Time End: 12:00pm  Date of Service:  10/19/2021     TeleMedicine Patient Consent    This visit was performed as a virtual video visit using a synchronous, two-way, audio-video telehealth technology platform. This visit was performed virtually during the 4584-3385 public health crisis and COVID-19 pandemic to reduce the risk of exposure to COVID-19 to the patient and/or provider. Patient identification was verified at the start of the visit, including the patient's telephone number and physical location. I discussed with the patient the nature of our telehealth visits, that:      There are potential benefits and risks of video-conferencing (e.g. limits to patient confidentiality) that differ from in-person sessions.  Confidentiality still applies for telepsychology services, and nobody will record the session without the permission from the others person(s).  We agree to use the video-conferencing platform selected for our virtual sessions, and the psychologist will explain how to use it.  You need to use a webcam or smartphone during the session.  It is important to be in a quiet, private space that is free of distractions (including cell phone or other devices) during the session.  It is important to use a secure internet connection rather than public/free Wi-Fi.  We need a back-up plan (e.g., phone number where you can be reached) to restart the session or to reschedule it, in the event of technical problems.  We need a safety plan that includes at least one emergency contact and the closest ER to your location, in the event of a crisis situation.  If you are not an adult, we need the permission of your parent or legal guardian (and their contact information) for you to participate in telepsychology sessions.    As your psychologist, I may determine that due to certain circumstances, telepsychology is no longer appropriate and that we should resume our sessions in-person. Patient does agree to proceed with telemedicine consultation. Patient's location: [address]     Provider location:  [address] [place] [address]  [facility]  [address] [place] [address] Dr lomeli to all Video Visits*      Interval History:    Continues to experience chronic pain and severe PTSD. Mostly homebound due to these symptoms. Aunt recently  of cancer, and he has another aunt in hospice care as well. Mental Status:    Appearance: casually dressed   Affect:  restricted   Attitude: Cooperative   Mood:   Dysphoric   Thought Process:  Linear and goal directed   Delusions: no evidence of delusions   Perceptions: No perceptual disturbance   Behavior:   Cooperative; fatigued   Psychomotor: Slowed   Speech:   Slow and Soft   Eye Contact: good   Orientation:  oriented to person, place, time, and general circumstances   Judgment & Insight:  normal insight and judgment       Risk Assessment:  Current Suicide Risk:  no suicidal ideation  Current Homicide Risk:  no homocidal ideation  Protective Factors: Support from family; Quaker convictions      Diagnosis:     Diagnosis Orders   1. Posttraumatic stress disorder     2. Major depressive disorder, single episode, moderate (HCC)         Psychotherapy Intervention:  Review of Symptoms,  Cognitive Behavioral Therapy    He continues to experience deaths of friends and family members, on nearly a weekly to monthly basis. These repeated deaths continue to make progress difficult, as he struggles with \"keeping [his] head above water,\" (e.g., combination of racing thoughts and feeling emotionally numb). He recently had a hole in his tooth and was forced to go to the dentist for repair.   This situation caused a great deal of anxiety, as he has not been to the dentist in several years and has only left of the house a few times in the past year. His wife and he were able to work with the dentist to create a tolerable situation for him (I.e., dentist is aware of his PTSD, arranged an appointment after hours, and let him take breaks during the procedure). Discussed how to apply the successes that he had in this experience to other goals to leave the house. For example, problem-solving with his wife, taking breaks. He feels embarrassed in these types of situations and embarrassment seems to be another cause of avoidance. Asked that he continue to consider how the successes of this situation can be applied. Also asked that he pray about and consider how he can work through embarrassment. Treatment plan goal(s) addressed: To reduce the frequency and severity of depressed mood, to improve the quality and quantity of sleep, and promote pain coping techniques. Homework/recommendations: See above    Progress since intake/last session: Mild progress with leaving house for dentist    Treatment plan review:  Treatment plan active, updated 5/17/21    Plan:  Follow up in 2 weeks.       Electronically signed by Chely Avila, PhD

## 2021-11-04 ENCOUNTER — VIRTUAL VISIT (OUTPATIENT)
Dept: PSYCHOLOGY | Age: 46
End: 2021-11-04
Payer: COMMERCIAL

## 2021-11-04 DIAGNOSIS — F43.10 POSTTRAUMATIC STRESS DISORDER: Primary | ICD-10-CM

## 2021-11-04 DIAGNOSIS — F32.1 MAJOR DEPRESSIVE DISORDER, SINGLE EPISODE, MODERATE (HCC): ICD-10-CM

## 2021-11-04 PROCEDURE — 90832 PSYTX W PT 30 MINUTES: CPT | Performed by: PSYCHOLOGIST

## 2021-11-04 NOTE — PROGRESS NOTES
[address] [facility]  11/4/2021  3:59 PM    Time Start: 11:30am  Time End: 12:00pm  Date of Service:  11/4/2021     This visit was performed as a virtual video visit using a synchronous, two-way, audio-video telehealth technology platform. Patient does agree to proceed with telemedicine consultation. Patient's location: [address]     Provider location:  [address] [place] [address] Dr lomeli to all Video Visits*      Interval History:    Continues to experience chronic pain and severe PTSD. Mostly homebound due to these symptoms. Recent family deaths from cancer. Trying to support wife who has increased job demands and is caring for aunt in hospice. Mental Status:    Appearance: casually dressed   Affect:  restricted   Attitude: Cooperative   Mood:   Dysphoric   Thought Process:  Linear and goal directed   Delusions: no evidence of delusions   Perceptions: No perceptual disturbance   Behavior:   Cooperative; fatigued   Psychomotor: Slowed   Speech:   Slow and Soft   Eye Contact: good   Orientation:  oriented to person, place, time, and general circumstances   Judgment & Insight:  normal insight and judgment       Risk Assessment:  Current Suicide Risk:  no suicidal ideation  Current Homicide Risk:  no homocidal ideation  Protective Factors: Support from family; Baptist convictions      Diagnosis:     Diagnosis Orders   1. Posttraumatic stress disorder     2. Major depressive disorder, single episode, moderate (HCC)         Psychotherapy Intervention:  Review of Symptoms,  Cognitive Behavioral Therapy    Continues to have sleep difficulty. Has been unable to try exposure therapy in leaving the house, due largely to his wife being out of town for work and busy caring for aunt in hospice. Family do not know the full extent of his anxiety and avoidance. Encouraged him to identify a friend or family member that can participate in treatment to be a partner/ in further improvement. Treatment plan goal(s) addressed: To reduce the frequency and severity of depressed mood, to improve the quality and quantity of sleep, and promote pain coping techniques. Homework/recommendations: See above    Progress since intake/last session: Stable    Treatment plan review:  Treatment plan active, updated 5/17/21    Plan:  Follow up in 2 weeks. [employer] faxed to Hernando Andino.       Electronically signed by Yaya Blank, PhD
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
## 2021-11-18 ENCOUNTER — VIRTUAL VISIT (OUTPATIENT)
Dept: PSYCHOLOGY | Age: 46
End: 2021-11-18
Payer: COMMERCIAL

## 2021-11-18 DIAGNOSIS — F32.1 MAJOR DEPRESSIVE DISORDER, SINGLE EPISODE, MODERATE (HCC): ICD-10-CM

## 2021-11-18 DIAGNOSIS — F43.10 POSTTRAUMATIC STRESS DISORDER: Primary | ICD-10-CM

## 2021-11-18 PROCEDURE — 90832 PSYTX W PT 30 MINUTES: CPT | Performed by: PSYCHOLOGIST

## 2021-12-02 ENCOUNTER — VIRTUAL VISIT (OUTPATIENT)
Dept: PSYCHOLOGY | Age: 46
End: 2021-12-02
Payer: COMMERCIAL

## 2021-12-02 DIAGNOSIS — F32.1 MAJOR DEPRESSIVE DISORDER, SINGLE EPISODE, MODERATE (HCC): ICD-10-CM

## 2021-12-02 DIAGNOSIS — F43.10 POSTTRAUMATIC STRESS DISORDER: Primary | ICD-10-CM

## 2021-12-02 PROCEDURE — 90832 PSYTX W PT 30 MINUTES: CPT | Performed by: PSYCHOLOGIST

## 2021-12-02 NOTE — PROGRESS NOTES
[facility]  12/3/2021  12:53 PM    Time Start: 11:30am  Time End: 12:00pm  Date of Service:  12/2/2021     THIS IS A WORKER'S COMPENSATION VISIT. PLEASE BILL TO ELIZABETH. CLAIM # [id number]. DOI = 3/24/19. T Mario Kerr # [id number] (12 THERAPY SESSIONS 8/12/21 TO 2/12/22)    This visit was performed as a virtual video visit using a synchronous, two-way, audio-video telehealth technology platform. Patient does agree to proceed with telemedicine consultation. Patient's location: [address]     Provider location:  [address] [place] [address] 54  [address] [place] [address] Dr lomeli to all Video Visits*      Interval History:    Continues to experience chronic pain and severe PTSD. Mostly homebound due to these symptoms. A family friend passed away this week. Mental Status:    Appearance: casually dressed   Affect:  restricted   Attitude: Cooperative   Mood:   Dysphoric   Thought Process:  Linear and goal directed   Delusions: no evidence of delusions   Perceptions: No perceptual disturbance   Behavior:   Cooperative; fatigued   Psychomotor: Slowed   Speech:   Slow and Soft   Eye Contact: good   Orientation:  oriented to person, place, time, and general circumstances   Judgment & Insight:  normal insight and judgment       Risk Assessment:  Current Suicide Risk:  no suicidal ideation  Current Homicide Risk:  no homocidal ideation  Protective Factors: Support from family; Church convictions      Diagnosis:     Diagnosis Orders   1. Posttraumatic stress disorder     2. Major depressive disorder, single episode, moderate (HCC)         Psychotherapy Intervention:  Review of Symptoms,  Cognitive Behavioral Therapy    He is working on the therapy assignment to leave the house with his wife. The goal had been to go for a drive in a local park.   He

## 2021-12-16 ENCOUNTER — VIRTUAL VISIT (OUTPATIENT)
Dept: PSYCHOLOGY | Age: 46
End: 2021-12-16
Payer: COMMERCIAL

## 2021-12-16 DIAGNOSIS — F43.10 POSTTRAUMATIC STRESS DISORDER: Primary | ICD-10-CM

## 2021-12-16 DIAGNOSIS — F32.1 MAJOR DEPRESSIVE DISORDER, SINGLE EPISODE, MODERATE (HCC): ICD-10-CM

## 2021-12-16 PROCEDURE — 90832 PSYTX W PT 30 MINUTES: CPT | Performed by: PSYCHOLOGIST

## 2021-12-16 NOTE — PROGRESS NOTES
[facility]  12/17/2021  2:00 PM    Time Start: 10:30am  Time End: 11:00am  Date of Service:  12/16/2021     THIS IS A WORKER'S COMPENSATION VISIT. PLEASE BILL TO ELIZABETH. CLAIM # [id number]. DOI = 3/24/19. T Mario 46 # [id number] (12 THERAPY SESSIONS 8/12/21 TO 2/12/22)    This visit was performed as a virtual video visit using a synchronous, two-way, audio-video telehealth technology platform. Patient does agree to proceed with telemedicine consultation. Patient's location: [address]     Provider location:  [address] [place] [address] 54  [address] [place] [address] Dr lomeli to all Video Visits*      Interval History:    Continues to experience chronic pain and severe PTSD. Mostly homebound due to these symptoms. Two family friends recently passed away, and he was able to participate by watching the virtual services. Mental Status:    Appearance: casually dressed   Affect:  restricted   Attitude: Cooperative   Mood:   Dysphoric   Thought Process:  Linear and goal directed   Delusions: no evidence of delusions   Perceptions: No perceptual disturbance   Behavior:   Cooperative; fatigued   Psychomotor: Slowed   Speech:   Slow and Soft   Eye Contact: good   Orientation:  oriented to person, place, time, and general circumstances   Judgment & Insight:  normal insight and judgment       Risk Assessment:  Current Suicide Risk:  no suicidal ideation  Current Homicide Risk:  no homocidal ideation  Protective Factors: Support from family; Mandaeism convictions      Diagnosis:     Diagnosis Orders   1. Posttraumatic stress disorder     2.  Major depressive disorder, single episode, moderate (HCC)         Psychotherapy Intervention:  Review of Symptoms,  Cognitive Behavioral Therapy    Praised his efforts for participating by watching the virtual  services. He was prescribed to see that pictures of him as a child were featured in a video slide show. Used this event as indication that people in his life still do think about and care about him, and given his Orthodoxy convictions, that this may be an inspiration from God to increase his efforts to be able to get out into public. He states he wants to continue to work on leaving the house with his wife, but due to her long work hours and forced overtime, he has been unable to do so. Discussed that it is advised to talk with his wife to make a goal of any time that she is off work to leave the house at least once. Advised that he also enlist the help of his adult eloisaon who lives with them. Advised the followin) go on drives with his wife or stepson as much as possible  2) utilize cognitive coping statements (I am safe in the car)  3) return home only when anxiety has reduced by half  4) try driving on the interstate, as there are no other stopped cars around and it will allow more control over how much time they spend on the road. Treatment plan goal(s) addressed: To reduce the frequency and severity of depressed mood, to improve the quality and quantity of sleep, and promote pain coping techniques. Homework/recommendations: See above. Complete PHQ-9 and PTSD checklist for treatment progress update. Progress since intake/last session: Mild progress since last session due to participating in virtual     Treatment plan review:  Updated treatment goals:    Leave the house 3x per week in 6 mos (2022), 2x in 4 mos (2022) 1x in 2 mos (2022). Plan:  Follow up in 3 weeks.        Electronically signed by Yaya Blank, PhD

## 2022-01-05 NOTE — PROGRESS NOTES
[facility]    Time Start: 11:20 am  Time End: 11:45 am  Date of Service:  1/6/2022     THIS IS A WORKER'S COMPENSATION VISIT. PLEASE BILL TO ELIZABETH. CLAIM # [id number]. DOI = 3/24/19. T Mario 46 # [id number] (12 THERAPY SESSIONS 8/12/21 TO 2/12/22)    This visit was performed as a virtual video visit using a synchronous, two-way, audio-video telehealth technology platform. Patient does agree to proceed with telemedicine consultation. Patient's location: [address]     Provider location:  [address] [place] [address]  modifier to all Video Visits*      Interval History:    On COVID quarantine due to step-son having COVID. Continues to experience chronic pain and severe PTSD. Mostly homebound due to these symptoms. He is showing progress by working to maintain productive daily schedule at home (e.g., minimizing TV, praying, reading, talking to supportive friend). Mental Status:    Appearance: casually dressed   Affect:  Mood congruent   Attitude: Cooperative   Mood:   Dysphoric, but improving   Thought Process:  Linear and goal directed   Delusions: no evidence of delusions   Perceptions: No perceptual disturbance   Behavior:   Cooperative; better energy than past sessions   Psychomotor: WNL   Speech:   WNL   Eye Contact: good   Orientation:  oriented to person, place, time, and general circumstances   Judgment & Insight:  normal insight and judgment       Risk Assessment:  Current Suicide Risk:  no suicidal ideation  Current Homicide Risk:  no homocidal ideation  Protective Factors: Support from family; Taoist convictions      Diagnosis:     Diagnosis Orders   1. Posttraumatic stress disorder     2.  Major depressive disorder, single episode, moderate (HCC)         Psychotherapy Intervention:  Review of Symptoms,  Cognitive Behavioral Therapy    Depression has been impacted by frequently experiencing deaths of loved ones and friends/neighbors. However, he has made good progress with coping with these deaths through contact with close friend Tania Vargas who is a , and using susie-based coping techniques (e.g., they are not suffering, they have \"gone home,\"). Advised he use these cognitive coping techniques to manage racing thoughts. Writing down coping statements would also be beneificial.    Plans to continue to work on leaving the house with his wife after quarantine ends. Advised that he also enlist the help of his adult janeth who lives with them. Advised the followin) go on drives with his wife or stepson as much as possible  2) utilize cognitive coping statements (I am safe in the car)  3) return home only when anxiety has reduced by half  4) try driving on the interstate, as there are no other stopped cars around and it will allow more control over how much time they spend on the road. Treatment plan goal(s) addressed: To reduce the frequency and severity of depressed mood, to improve the quality and quantity of sleep, and promote pain coping techniques. Homework/recommendations: See above. Complete PTSD checklist for treatment progress update. Progress since intake/last session: Mild progress since last session. PHQ-9 = 15. Treatment plan review:  Updated treatment goals:    Leave the house 3x per week in 6 mos (2022), 2x in 4 mos (2022) 1x in 2 mos (2022). Plan:  Follow up in 2 weeks.        Electronically signed by Linda Lo, PhD

## 2022-01-06 ENCOUNTER — VIRTUAL VISIT (OUTPATIENT)
Dept: PSYCHOLOGY | Age: 47
End: 2022-01-06
Payer: COMMERCIAL

## 2022-01-06 DIAGNOSIS — F43.10 POSTTRAUMATIC STRESS DISORDER: Primary | ICD-10-CM

## 2022-01-06 DIAGNOSIS — F32.1 MAJOR DEPRESSIVE DISORDER, SINGLE EPISODE, MODERATE (HCC): ICD-10-CM

## 2022-01-06 PROCEDURE — 90832 PSYTX W PT 30 MINUTES: CPT | Performed by: PSYCHOLOGIST

## 2022-01-19 ENCOUNTER — VIRTUAL VISIT (OUTPATIENT)
Dept: PSYCHOLOGY | Age: 47
End: 2022-01-19
Payer: COMMERCIAL

## 2022-01-19 DIAGNOSIS — F43.10 POSTTRAUMATIC STRESS DISORDER: Primary | ICD-10-CM

## 2022-01-19 DIAGNOSIS — F32.1 MAJOR DEPRESSIVE DISORDER, SINGLE EPISODE, MODERATE (HCC): ICD-10-CM

## 2022-01-19 PROCEDURE — 90832 PSYTX W PT 30 MINUTES: CPT | Performed by: PSYCHOLOGIST

## 2022-01-19 NOTE — PROGRESS NOTES
[facility]    Time Start: 1:00 pm  Time End: 1:25 pm  Date of Service:  1/19/2022     THIS IS A WORKER'S COMPENSATION VISIT. PLEASE BILL TO ELIZABETH. CLAIM # [id number]. DOI = 3/24/19. T [address] # [id number] (12 THERAPY SESSIONS 8/12/21 TO 2/12/22)    This visit was performed as a virtual video visit using a synchronous, two-way, audio-video telehealth technology platform. Patient does agree to proceed with telemedicine consultation. Patient's location: [address]     Provider location:  [address] [place] [address]  688[facility] modifier to all Video Visits*      Interval History:    Reports pain exacerbation (neck, arm, headaches), interfering with sleep and functioning. Family still on COVID quarantine due to his step-son being infected. Mental Status:    Appearance: casually dressed   Affect:  Mood congruent   Attitude: Cooperative   Mood:   Dysphoric   Thought Process:  Linear and goal directed   Delusions: no evidence of delusions   Perceptions: No perceptual disturbance   Behavior:   Cooperative; better energy than past sessions   Psychomotor: WNL   Speech:   WNL   Eye Contact: good   Orientation:  oriented to person, place, time, and general circumstances   Judgment & Insight:  normal insight and judgment       Risk Assessment:  Current Suicide Risk:  no suicidal ideation  Current Homicide Risk:  no homocidal ideation  Protective Factors: Support from family; Zoroastrian convictions      Diagnosis:     Diagnosis Orders   1. Posttraumatic stress disorder     2. Major depressive disorder, single episode, moderate (HCC)         Psychotherapy Intervention:  Review of Symptoms, Problem-solving, Goal-setting and monitoring    Stated he tries to manage pain with daily schedule, but has been difficult due to pain exacerbation. Plans to follow up with [facility], Dr. Earl Lesch, and to ask about chiropractic evaluation and further injections. Plans to continue to work on leaving the house with his wife after quarantine ends. Advised that he also enlist the help of his adult stepson who lives with them. Advised the followin) go on drives with his wife or stepson as much as possible  2) utilize cognitive coping statements (I am safe in the car)  3) return home only when anxiety has reduced by half  4) try driving on the interstate, as there are no other stopped cars around and it will allow more control over how much time they spend on the road. Treatment plan goal(s) addressed: To reduce the frequency and severity of depressed mood, to improve the quality and quantity of sleep, and promote pain coping techniques. Homework/recommendations: See above. Complete PTSD checklist for treatment progress update. Progress since intake/last session: Mild progress since last session. PHQ-9 = 15. Treatment plan review:  Updated treatment goals:    Leave the house 3x per week in 6 mos (2022), 2x in 4 mos (2022) 1x in 2 mos (2022). Plan:  Follow up in 2 weeks.        Electronically signed by Alis Whyte, PhD

## 2022-02-02 ENCOUNTER — PATIENT MESSAGE (OUTPATIENT)
Dept: PSYCHOLOGY | Age: 47
End: 2022-02-02

## 2022-02-03 ENCOUNTER — VIRTUAL VISIT (OUTPATIENT)
Dept: PSYCHOLOGY | Age: 47
End: 2022-02-03
Payer: COMMERCIAL

## 2022-02-03 DIAGNOSIS — F43.10 POSTTRAUMATIC STRESS DISORDER: Primary | ICD-10-CM

## 2022-02-03 DIAGNOSIS — F32.1 MAJOR DEPRESSIVE DISORDER, SINGLE EPISODE, MODERATE (HCC): ICD-10-CM

## 2022-02-03 PROCEDURE — 90832 PSYTX W PT 30 MINUTES: CPT | Performed by: PSYCHOLOGIST

## 2022-02-03 NOTE — PROGRESS NOTES
Discussed their options and urged him to prioritize. He is likely to make better progress if he is living in a safer environment. He continues to follow advice on improving his functioning at home. Reads vs watching television. Considering allowing close friend Danitza Still to visit, who has been a major spiritual support. He has been unable to attempt trips out of the house due to his wife's busy work schedule and winter weather. Process below established in prior sessions. Advised the followin) go on drives with his wife or stepson as much as possible  2) utilize cognitive coping statements (I am safe in the car)  3) return home only when anxiety has reduced by half  4) try driving on the interstate, as there are no other stopped cars around and it will allow more control over how much time they spend on the road. Treatment plan goal(s) addressed: To reduce the frequency and severity of depressed mood, to improve the quality and quantity of sleep, and promote pain coping techniques. Homework/recommendations: See above. Review prior lessons from CPT to review in future sessions. Progress since intake/last session: Mild progress since last session. PTSD checklist = 44, improvement from 48 at last review. Treatment plan review:  Updated treatment goals:    Leave the house 3x per week in 6 mos (2022), 2x in 4 mos (2022) 1x in 2 mos (2022). Plan:  Follow up in 2 weeks.        Electronically signed by Krystle Elder, PhD

## 2022-02-17 ENCOUNTER — TELEMEDICINE (OUTPATIENT)
Dept: PSYCHOLOGY | Age: 47
End: 2022-02-17
Payer: COMMERCIAL

## 2022-02-17 DIAGNOSIS — F43.10 POSTTRAUMATIC STRESS DISORDER: Primary | ICD-10-CM

## 2022-02-17 DIAGNOSIS — F32.1 MAJOR DEPRESSIVE DISORDER, SINGLE EPISODE, MODERATE (HCC): ICD-10-CM

## 2022-02-17 PROCEDURE — 90832 PSYTX W PT 30 MINUTES: CPT | Performed by: PSYCHOLOGIST
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
## 2022-03-07 NOTE — PROGRESS NOTES
improvement in that he was able to have his wife be a  to work through his panic. He estimates that anxiety increased four separate times but that he worked through the first three. [place], he could not handle the anxiety and they went home. The trip lasted around 45 minutes. Praised his efforts, and set goal to only go home when anxiety has decreased by at least half. Encouraged him to repeat this process as often as possible. Plan for leaving the house is as follows:    1) [place] before attempting to leave the house. Practice cognitive coping statements emphasizing on safety. 2) keep same route through local park  3) utilize cognitive coping statements (I am safe in the car)  4) practice relaxation breathing  5) ask his wife to  him on coping statements  6) return home only when anxiety has reduced by half    His overall stress level is increased due to his aunt's hospice status. He tends to sleep worse when these type of situations occur, and he has had numerous experiences with illness and death of loved ones over the past few years. Reviewed importance of keeping a daily schedule of meals, hygiene, prayer/meditation, and household tasks. Treatment plan goal(s) addressed: To reduce the frequency and severity of depressed mood, to improve the quality and quantity of sleep, and promote pain coping techniques. Homework/recommendations: See above. Progress since intake/last session: Mild progress since last session. PTSD checklist = 44, improvement from 48 at last review. Treatment plan review:  Updated treatment goals:    Leave the house 3x per week in 6 mos (June 2022), 2x in 4 mos (April 2022) 1x in 2 mos (Feb 2022). Plan:  Follow up in 2 weeks.        Electronically signed by Vinicio Gonzalez, PhD

## 2022-03-08 ENCOUNTER — TELEMEDICINE (OUTPATIENT)
Dept: PSYCHOLOGY | Age: 47
End: 2022-03-08
Payer: COMMERCIAL

## 2022-03-08 DIAGNOSIS — F32.1 MAJOR DEPRESSIVE DISORDER, SINGLE EPISODE, MODERATE (HCC): ICD-10-CM

## 2022-03-08 DIAGNOSIS — F43.10 POSTTRAUMATIC STRESS DISORDER: Primary | ICD-10-CM

## 2022-03-08 PROCEDURE — 90832 PSYTX W PT 30 MINUTES: CPT | Performed by: PSYCHOLOGIST

## 2022-03-10 ENCOUNTER — OFFICE VISIT (OUTPATIENT)
Dept: CARDIOLOGY CLINIC | Age: 47
End: 2022-03-10
Payer: MEDICARE

## 2022-03-10 VITALS
SYSTOLIC BLOOD PRESSURE: 116 MMHG | HEIGHT: 72 IN | HEART RATE: 73 BPM | RESPIRATION RATE: 18 BRPM | DIASTOLIC BLOOD PRESSURE: 64 MMHG | WEIGHT: 218.6 LBS | BODY MASS INDEX: 29.61 KG/M2

## 2022-03-10 DIAGNOSIS — I42.1 CARDIOMYOPATHY, HYPERTROPHIC OBSTRUCTIVE (HCC): Primary | ICD-10-CM

## 2022-03-10 DIAGNOSIS — E78.00 HYPERCHOLESTEROLEMIA: ICD-10-CM

## 2022-03-10 PROCEDURE — 93000 ELECTROCARDIOGRAM COMPLETE: CPT | Performed by: INTERNAL MEDICINE

## 2022-03-10 PROCEDURE — 99214 OFFICE O/P EST MOD 30 MIN: CPT | Performed by: INTERNAL MEDICINE

## 2022-03-10 RX ORDER — TERAZOSIN 10 MG/1
1 CAPSULE ORAL
COMMUNITY

## 2022-03-10 RX ORDER — METOPROLOL SUCCINATE 100 MG/1
100 TABLET, EXTENDED RELEASE ORAL 2 TIMES DAILY
COMMUNITY
End: 2022-03-10 | Stop reason: SDUPTHER

## 2022-03-10 RX ORDER — BUPROPION HYDROCHLORIDE 150 MG/1
TABLET ORAL
COMMUNITY

## 2022-03-10 RX ORDER — ATORVASTATIN CALCIUM 40 MG/1
40 TABLET, FILM COATED ORAL DAILY
Qty: 30 TABLET | Refills: 11 | Status: SHIPPED | OUTPATIENT
Start: 2022-03-10

## 2022-03-10 RX ORDER — METOPROLOL SUCCINATE 100 MG/1
100 TABLET, EXTENDED RELEASE ORAL 2 TIMES DAILY
Qty: 60 TABLET | Refills: 11 | Status: SHIPPED | OUTPATIENT
Start: 2022-03-10

## 2022-03-10 RX ORDER — BUPROPION HYDROCHLORIDE 300 MG/1
TABLET ORAL
COMMUNITY

## 2022-03-10 RX ORDER — ASPIRIN 81 MG/1
TABLET, CHEWABLE ORAL
COMMUNITY
Start: 2022-01-25

## 2022-03-10 RX ORDER — ATORVASTATIN CALCIUM 40 MG/1
TABLET, FILM COATED ORAL
COMMUNITY
Start: 2022-03-10 | End: 2022-03-10 | Stop reason: SDUPTHER

## 2022-03-10 NOTE — PROGRESS NOTES
Patient Active Problem List   Diagnosis    Hyperlipidemia    Monocular esotropia of right eye    Neck pain    Cervical disc disorder    Cervical radiculopathy    Cervical spondylosis       Current Outpatient Medications   Medication Sig Dispense Refill    aspirin 81 MG chewable tablet CHEW AND SWALLOW 1 TABLET BY MOUTH EVERY DAY      buPROPion (WELLBUTRIN XL) 150 MG extended release tablet Take by mouth      buPROPion (WELLBUTRIN XL) 300 MG extended release tablet Take by mouth      terazosin (HYTRIN) 10 MG capsule Take 1 capsule by mouth      metoprolol succinate (TOPROL XL) 100 MG extended release tablet Take 1 tablet by mouth 2 times daily 60 tablet 11    atorvastatin (LIPITOR) 40 MG tablet Take 1 tablet by mouth daily 30 tablet 11    sertraline (ZOLOFT) 50 MG tablet Take 50 mg by mouth daily      prazosin (MINIPRESS) 2 MG capsule Take 2 mg by mouth nightly      traMADol (ULTRAM) 50 MG tablet Take 50 mg by mouth every 6 hours as needed. 0    omeprazole (PRILOSEC) 20 MG delayed release capsule Take 20 mg by mouth   0    Lutein 6 MG CAPS Take 1 tablet by mouth daily Ld 1/21/2019      Multiple Vitamins-Minerals (THERAPEUTIC MULTIVITAMIN-MINERALS) tablet Take 1 tablet by mouth daily Ld 1/21/2019      simvastatin (ZOCOR) 40 MG tablet Take 40 mg by mouth nightly. No current facility-administered medications for this visit. CC:    Patient is new to me - former patient of Dr. Jorgito Lyn and [place] at [facility] [place]:  1. Cardiomyopathy, hypertrophic obstructive ([address]    2. Hypercholesterolemia        HPI:  Patient is doing well without any specific cardiac problems. Patient denies any unusual shortness of breath, chest pain, lightheadedness or dizziness. Patient is tolerating medications well without side effects. Walks frequently without any problems as long as he does not overdo it. Tolerating medications well without side effects.     ROS:   General: No unusual weight gain, no change in exercise tolerance  Skin: No rash or itching  EENT: No vision changes or nosebleeds  Cardiovascular: No orthopnea or paroxysmal nocturnal dyspnea  Respiratory: No cough or hemoptysis  Gastrointestinal: No hematemesis or recent changes in bowel habits  Genitourinary: No hematuria, urgency or frequency  Musculoskeletal: No muscular weakness or joint swelling   Neurologic / Psychiatric: No incoordination or convulsions  Allergic / Immunologic/ Lymphatic / Endocrine: No anemia or bleeding tendency    Social History     Socioeconomic History    Marital status:      Spouse name: Not on file    Number of children: Not on file    Years of education: Not on file    Highest education level: Not on file   Occupational History    Not on file   Tobacco Use    Smoking status: Never Smoker    Smokeless tobacco: Never Used   Vaping Use    Vaping Use: Never used   Substance and Sexual Activity    Alcohol use: No     Comment: Rarely, No coffee    Drug use: No    Sexual activity: Not Currently   Other Topics Concern    Not on file   Social History Narrative    Not on file     Social Determinants of Health     Financial Resource Strain:     Difficulty of Paying Living Expenses: Not on file   Food Insecurity:     Worried About Running Out of Food in the Last Year: Not on file    Michelle of Food in the Last Year: Not on file   Transportation Needs:     Lack of Transportation (Medical): Not on file    Lack of Transportation (Non-Medical):  Not on file   Physical Activity:     Days of Exercise per Week: Not on file    Minutes of Exercise per Session: Not on file   Stress:     Feeling of Stress : Not on file   Social Connections:     Frequency of Communication with Friends and Family: Not on file    Frequency of Social Gatherings with Friends and Family: Not on file    Attends Protestant Services: Not on file    Active Member of Clubs or Organizations: Not on file    Attends Club or Organization Meetings: Not on file  Marital Status: Not on file   Intimate Partner Violence:     Fear of Current or Ex-Partner: Not on file    Emotionally Abused: Not on file    Physically Abused: Not on file    Sexually Abused: Not on file   Housing Stability:     Unable to Pay for Housing in the Last Year: Not on file    Number of [place] in the Last Year: Not on file    Unstable Housing in the Last Year: Not on file       Family History   Problem Relation Age of Onset    Heart Failure Mother     Cancer Father        Past Medical History:   Diagnosis Date    Cardiomyopathy, hypertrophic ([address]     Eye disorder     right    Hyperlipidemia     Hypertrophic cardiomyopathy ([address]     Low back pain     Major depressive disorder     Neck pain     PTSD (post-traumatic stress disorder)        PHYSICAL EXAM:  CONSTITUTIONAL:  Well developed, well nourished    Vitals:    03/10/22 1234   BP: 116/64   Pulse: 73   Resp: 18   Weight: 218 lb 9.6 oz (99.2 kg)   Height: 6' (1.829 m)     HEAD & FACE: Normocephalic. Symmetric. EYES: No xanthelasma. Conjunctivae not injected. EARS, NOSE, MOUTH & THROAT: Good dentition. No oral pallor or cyanosis. NECK: No JVD at 30 degrees. No thyromegaly. RESPIRATORY: Clear to auscultation and percussion in all fields. No use of accessory muscle or intercostal retractions. CARDIOVASCULAR: Regular rate and rhythm. No lifts or thrills on palpitation. Auscultation with normal S1-S2 in intensity and splitting. Grade 2/6 murmur noted in the apical area that increases in intensity with Valsalva. No carotid bruits. Abdominal aorta not enlarged. Femoral arteries without bruits. Pedal pulses 2+. No edema. ABDOMEN: Soft without hepatic or splenic enlargement. No tenderness. MUSCULOSKELETAL: No kyphosis or scoliosis of the back. Good muscle strength and tone. No muscle atrophy. Normal gait and ability to undergo exercise stress testing. EXTREMITIES: No clubbing or cyanosis.     SKIN: No Xanthomas or ulcerations. NEUROLOGIC: Oriented to time, place and person. Normal mood and affect. LYMPHATIC:  No palpable neck or supraclavicular nodes. No splenomegaly. EKG: the EKG tracing was reviewed and found to reveal: Normal sinus rhythm. No change compared to prior tracing. ASSESSMENT:                                                     ORDERS:       Diagnosis Orders   1. Cardiomyopathy, hypertrophic obstructive (HCC)  EKG 12 lead   2. Hypercholesterolemia       Above assessment cardiac issues stable. PLAN:   See above orders. Medication reconciliation completed. Old records were reviewed and found to reveal: Cardiac catheterization 7/26/2019 without CAD. Echo on 11/25/2019 with severe septal asymmetric left ventricular hypertrophy. Discussed issues that would prompt earlier evaluation. Same cardiac medications. Follow-up office visit in 1 year.

## 2022-03-23 ENCOUNTER — TELEMEDICINE (OUTPATIENT)
Dept: PSYCHOLOGY | Age: 47
End: 2022-03-23
Payer: COMMERCIAL

## 2022-03-23 DIAGNOSIS — F32.1 MAJOR DEPRESSIVE DISORDER, SINGLE EPISODE, MODERATE (HCC): ICD-10-CM

## 2022-03-23 DIAGNOSIS — F43.10 POSTTRAUMATIC STRESS DISORDER: Primary | ICD-10-CM

## 2022-03-23 PROCEDURE — 90832 PSYTX W PT 30 MINUTES: CPT | Performed by: PSYCHOLOGIST
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
## 2022-04-07 ENCOUNTER — TELEMEDICINE (OUTPATIENT)
Dept: PSYCHOLOGY | Age: 47
End: 2022-04-07
Payer: COMMERCIAL

## 2022-04-07 DIAGNOSIS — F32.1 MAJOR DEPRESSIVE DISORDER, SINGLE EPISODE, MODERATE (HCC): ICD-10-CM

## 2022-04-07 DIAGNOSIS — F43.10 POSTTRAUMATIC STRESS DISORDER: Primary | ICD-10-CM

## 2022-04-07 PROCEDURE — 90832 PSYTX W PT 30 MINUTES: CPT | Performed by: PSYCHOLOGIST
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
## 2022-04-25 ENCOUNTER — TELEMEDICINE (OUTPATIENT)
Dept: PSYCHOLOGY | Age: 47
End: 2022-04-25
Payer: COMMERCIAL

## 2022-04-25 DIAGNOSIS — F32.1 MAJOR DEPRESSIVE DISORDER, SINGLE EPISODE, MODERATE (HCC): ICD-10-CM

## 2022-04-25 DIAGNOSIS — F43.10 POSTTRAUMATIC STRESS DISORDER: Primary | ICD-10-CM

## 2022-04-25 PROCEDURE — 90832 PSYTX W PT 30 MINUTES: CPT | Performed by: PSYCHOLOGIST
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
## 2022-05-12 ENCOUNTER — TELEMEDICINE (OUTPATIENT)
Dept: PSYCHOLOGY | Age: 47
End: 2022-05-12
Payer: COMMERCIAL

## 2022-05-12 DIAGNOSIS — F32.1 MAJOR DEPRESSIVE DISORDER, SINGLE EPISODE, MODERATE (HCC): ICD-10-CM

## 2022-05-12 DIAGNOSIS — F43.10 POSTTRAUMATIC STRESS DISORDER: Primary | ICD-10-CM

## 2022-05-12 PROCEDURE — 90832 PSYTX W PT 30 MINUTES: CPT | Performed by: PSYCHOLOGIST

## 2022-05-12 ASSESSMENT — ANXIETY QUESTIONNAIRES
4. TROUBLE RELAXING: 2
1. FEELING NERVOUS, ANXIOUS, OR ON EDGE: 2
GAD7 TOTAL SCORE: 10
3. WORRYING TOO MUCH ABOUT DIFFERENT THINGS: 1
6. BECOMING EASILY ANNOYED OR IRRITABLE: 2
5. BEING SO RESTLESS THAT IT IS HARD TO SIT STILL: 1
2. NOT BEING ABLE TO STOP OR CONTROL WORRYING: 1
7. FEELING AFRAID AS IF SOMETHING AWFUL MIGHT HAPPEN: 1

## 2022-05-12 ASSESSMENT — PATIENT HEALTH QUESTIONNAIRE - PHQ9
SUM OF ALL RESPONSES TO PHQ QUESTIONS 1-9: 11
7. TROUBLE CONCENTRATING ON THINGS, SUCH AS READING THE NEWSPAPER OR WATCHING TELEVISION: 1
8. MOVING OR SPEAKING SO SLOWLY THAT OTHER PEOPLE COULD HAVE NOTICED. OR THE OPPOSITE, BEING SO FIGETY OR RESTLESS THAT YOU HAVE BEEN MOVING AROUND A LOT MORE THAN USUAL: 1
SUM OF ALL RESPONSES TO PHQ9 QUESTIONS 1 & 2: 4
SUM OF ALL RESPONSES TO PHQ QUESTIONS 1-9: 11
5. POOR APPETITE OR OVEREATING: 0
9. THOUGHTS THAT YOU WOULD BE BETTER OFF DEAD, OR OF HURTING YOURSELF: 0
3. TROUBLE FALLING OR STAYING ASLEEP: 3
2. FEELING DOWN, DEPRESSED OR HOPELESS: 2
SUM OF ALL RESPONSES TO PHQ QUESTIONS 1-9: 11
1. LITTLE INTEREST OR PLEASURE IN DOING THINGS: 2
4. FEELING TIRED OR HAVING LITTLE ENERGY: 1
6. FEELING BAD ABOUT YOURSELF - OR THAT YOU ARE A FAILURE OR HAVE LET YOURSELF OR YOUR FAMILY DOWN: 1
SUM OF ALL RESPONSES TO PHQ QUESTIONS 1-9: 11

## 2022-05-31 ENCOUNTER — TELEMEDICINE (OUTPATIENT)
Dept: PSYCHOLOGY | Age: 47
End: 2022-05-31
Payer: COMMERCIAL

## 2022-05-31 DIAGNOSIS — F32.1 MAJOR DEPRESSIVE DISORDER, SINGLE EPISODE, MODERATE (HCC): ICD-10-CM

## 2022-05-31 DIAGNOSIS — F43.10 POSTTRAUMATIC STRESS DISORDER: Primary | ICD-10-CM

## 2022-05-31 PROCEDURE — 90832 PSYTX W PT 30 MINUTES: CPT | Performed by: PSYCHOLOGIST
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
## 2022-06-16 ENCOUNTER — TELEMEDICINE (OUTPATIENT)
Dept: PSYCHOLOGY | Age: 47
End: 2022-06-16
Payer: COMMERCIAL

## 2022-06-16 DIAGNOSIS — F32.1 MAJOR DEPRESSIVE DISORDER, SINGLE EPISODE, MODERATE (HCC): ICD-10-CM

## 2022-06-16 DIAGNOSIS — F43.10 POSTTRAUMATIC STRESS DISORDER: Primary | ICD-10-CM

## 2022-06-16 PROCEDURE — 90832 PSYTX W PT 30 MINUTES: CPT | Performed by: PSYCHOLOGIST
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
## 2022-06-29 ENCOUNTER — TELEMEDICINE (OUTPATIENT)
Dept: PSYCHOLOGY | Age: 47
End: 2022-06-29
Payer: COMMERCIAL

## 2022-06-29 DIAGNOSIS — F32.1 MAJOR DEPRESSIVE DISORDER, SINGLE EPISODE, MODERATE (HCC): ICD-10-CM

## 2022-06-29 DIAGNOSIS — F43.10 POSTTRAUMATIC STRESS DISORDER: Primary | ICD-10-CM

## 2022-06-29 PROCEDURE — 90832 PSYTX W PT 30 MINUTES: CPT | Performed by: PSYCHOLOGIST

## 2022-07-13 ENCOUNTER — TELEMEDICINE (OUTPATIENT)
Dept: PSYCHOLOGY | Age: 47
End: 2022-07-13
Payer: COMMERCIAL

## 2022-07-13 DIAGNOSIS — F32.1 MAJOR DEPRESSIVE DISORDER, SINGLE EPISODE, MODERATE (HCC): ICD-10-CM

## 2022-07-13 DIAGNOSIS — F43.10 POSTTRAUMATIC STRESS DISORDER: Primary | ICD-10-CM

## 2022-07-13 PROCEDURE — 90832 PSYTX W PT 30 MINUTES: CPT | Performed by: PSYCHOLOGIST

## 2022-07-13 NOTE — PROGRESS NOTES
[facility]    Time Start: 1:30 pm  Time End:  2:00 pm  Date of Service:  7/13/2022     THIS IS A WORKER'S COMPENSATION VISIT. PLEASE BILL TO ELIZABETH. CLAIM # [id number]. DOI = 3/24/19. T Mario 46 # [id number] (12 THERAPY SESSIONS 2/17/22 to 8/17/22)    This visit was performed as a virtual video visit using a synchronous, two-way, audio-video telehealth technology platform. Patient does agree to proceed with telemedicine consultation. Patient's location: [address]     [facility] David Mando modifier to all Video Visits*      Interval History: A close friend (Jodi Zhao) of Kirstin and his wife, who is only in her 45s, had a severe sepsis infection and subsequent stroke. She is currently very physically debilitated from stroke and in a care facility. This woman had cared for her own mother also in a similar state. His wife is now in a position to take a lead role in the care of both of them. His wife's stress has an effect on him, and he is very concerned especially because she has elevated blood pressure. He was able to attempt driving in a empty parking lot with his wife, and feels he did better than he had expected he would. He has had very limited driving in the past 2 years. He has a scheduled appointment with his POR Dr. Tammy Borges.       Mental Status:    Appearance: casually dressed   Affect:  Mood congruent   Attitude: Cooperative   Mood:   Dysphoric   Thought Process:  Linear and goal directed   Delusions: no evidence of delusions   Perceptions: No perceptual disturbance   Behavior:   Cooperative   Psychomotor: WNL   Speech:   Soft   Eye Contact: good   Orientation:  oriented to person, place, time, and general circumstances   Judgment & Insight:  normal insight and judgment       Risk Assessment:  Current Suicide & Homicide Risk:  Deferred, prior assessment yielded minimal risk and no new circumstances to warrant re-assessment  Protective Factors: Support from family; Adventism convictions      Diagnosis:     Diagnosis Orders   1. Posttraumatic stress disorder     2. Major depressive disorder, single episode, moderate (HCC)         Psychotherapy Intervention:  Review of Symptoms, Behavior Therapy    He and his wife have experienced yet another highly stressful personal experience with a close friend which has put additional strain on them. He states that he and his wife are supportive of one another. States that she plans to see her doctor about the blood pressure issue. Candidly discussed the ongoing patterns of these tragic situations that he finds himself exposed to and the effect on his mood. While his wife feels she needs to help her friend's family, he will discuss with her the difficult decision of needing to say no to some of the demands put on her. Treatment plan goal(s) addressed: To reduce the frequency and severity of depressed mood, to improve the quality and quantity of sleep, and promote pain coping techniques. Homework/recommendations: See above. Progress since intake/last session:  Good recent progress in terms of anxiety and depression. PHQ9 score reduced from 15 in Jan 2022 to 11 as of last assessment on 5/12/22  (+25% reduction); and GAD7 reduced from 15 in May 2021 to 10 on 5/12/22 (33% reduction). As of last review PTSD checklist = 44, improvement from 48 at prior review. However ongoing traumatization impacts his ability to improve. Treatment plan review:    Current goals are to:  1) exposure practice to leave house; 2) identify options for move from current location.     Plan:  Follow up in 2 weeks, 7/28/22    Electronically signed by Yasmin Ahn, PhD
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
## 2022-07-28 ENCOUNTER — TELEMEDICINE (OUTPATIENT)
Dept: PSYCHOLOGY | Age: 47
End: 2022-07-28
Payer: COMMERCIAL

## 2022-07-28 DIAGNOSIS — F32.1 MAJOR DEPRESSIVE DISORDER, SINGLE EPISODE, MODERATE (HCC): ICD-10-CM

## 2022-07-28 DIAGNOSIS — F43.10 POSTTRAUMATIC STRESS DISORDER: Primary | ICD-10-CM

## 2022-07-28 PROCEDURE — 90832 PSYTX W PT 30 MINUTES: CPT | Performed by: PSYCHOLOGIST
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
## 2022-08-15 ENCOUNTER — TELEMEDICINE (OUTPATIENT)
Dept: PSYCHOLOGY | Age: 47
End: 2022-08-15
Payer: COMMERCIAL

## 2022-08-15 DIAGNOSIS — F43.10 POSTTRAUMATIC STRESS DISORDER: Primary | ICD-10-CM

## 2022-08-15 DIAGNOSIS — F32.1 MAJOR DEPRESSIVE DISORDER, SINGLE EPISODE, MODERATE (HCC): ICD-10-CM

## 2022-08-15 PROCEDURE — 90832 PSYTX W PT 30 MINUTES: CPT | Performed by: PSYCHOLOGIST

## 2022-08-15 ASSESSMENT — ANXIETY QUESTIONNAIRES
4. TROUBLE RELAXING: 2
3. WORRYING TOO MUCH ABOUT DIFFERENT THINGS: 2
1. FEELING NERVOUS, ANXIOUS, OR ON EDGE: 2
7. FEELING AFRAID AS IF SOMETHING AWFUL MIGHT HAPPEN: 1
2. NOT BEING ABLE TO STOP OR CONTROL WORRYING: 1
GAD7 TOTAL SCORE: 0.2
5. BEING SO RESTLESS THAT IT IS HARD TO SIT STILL: 0
GAD7 TOTAL SCORE: 9
6. BECOMING EASILY ANNOYED OR IRRITABLE: 1

## 2022-08-15 ASSESSMENT — PATIENT HEALTH QUESTIONNAIRE - PHQ9
2. FEELING DOWN, DEPRESSED OR HOPELESS: 3
SUM OF ALL RESPONSES TO PHQ QUESTIONS 1-9: 16
4. FEELING TIRED OR HAVING LITTLE ENERGY: 3
5. POOR APPETITE OR OVEREATING: 1
7. TROUBLE CONCENTRATING ON THINGS, SUCH AS READING THE NEWSPAPER OR WATCHING TELEVISION: 2
1. LITTLE INTEREST OR PLEASURE IN DOING THINGS: 2
8. MOVING OR SPEAKING SO SLOWLY THAT OTHER PEOPLE COULD HAVE NOTICED. OR THE OPPOSITE, BEING SO FIGETY OR RESTLESS THAT YOU HAVE BEEN MOVING AROUND A LOT MORE THAN USUAL: 0
SUM OF ALL RESPONSES TO PHQ9 QUESTIONS 1 & 2: 5
SUM OF ALL RESPONSES TO PHQ QUESTIONS 1-9: 16
6. FEELING BAD ABOUT YOURSELF - OR THAT YOU ARE A FAILURE OR HAVE LET YOURSELF OR YOUR FAMILY DOWN: 2
SUM OF ALL RESPONSES TO PHQ QUESTIONS 1-9: 16
9. THOUGHTS THAT YOU WOULD BE BETTER OFF DEAD, OR OF HURTING YOURSELF: 0
SUM OF ALL RESPONSES TO PHQ QUESTIONS 1-9: 16
3. TROUBLE FALLING OR STAYING ASLEEP: 3

## 2022-08-29 ENCOUNTER — TELEMEDICINE (OUTPATIENT)
Dept: PSYCHOLOGY | Age: 47
End: 2022-08-29
Payer: COMMERCIAL

## 2022-08-29 DIAGNOSIS — F43.10 POSTTRAUMATIC STRESS DISORDER: Primary | ICD-10-CM

## 2022-08-29 DIAGNOSIS — F32.1 MAJOR DEPRESSIVE DISORDER, SINGLE EPISODE, MODERATE (HCC): ICD-10-CM

## 2022-08-29 PROCEDURE — 90832 PSYTX W PT 30 MINUTES: CPT | Performed by: PSYCHOLOGIST
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
## 2022-09-13 ENCOUNTER — TELEMEDICINE (OUTPATIENT)
Dept: PSYCHOLOGY | Age: 47
End: 2022-09-13
Payer: COMMERCIAL

## 2022-09-13 DIAGNOSIS — F32.1 MAJOR DEPRESSIVE DISORDER, SINGLE EPISODE, MODERATE (HCC): ICD-10-CM

## 2022-09-13 DIAGNOSIS — F43.10 POSTTRAUMATIC STRESS DISORDER: Primary | ICD-10-CM

## 2022-09-13 PROCEDURE — 90832 PSYTX W PT 30 MINUTES: CPT | Performed by: PSYCHOLOGIST

## 2022-09-29 ENCOUNTER — TELEMEDICINE (OUTPATIENT)
Dept: PSYCHOLOGY | Age: 47
End: 2022-09-29
Payer: COMMERCIAL

## 2022-09-29 DIAGNOSIS — F43.10 POSTTRAUMATIC STRESS DISORDER: Primary | ICD-10-CM

## 2022-09-29 DIAGNOSIS — F32.1 MAJOR DEPRESSIVE DISORDER, SINGLE EPISODE, MODERATE (HCC): ICD-10-CM

## 2022-09-29 PROCEDURE — 90832 PSYTX W PT 30 MINUTES: CPT | Performed by: PSYCHOLOGIST
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
## 2022-10-17 ENCOUNTER — TELEMEDICINE (OUTPATIENT)
Dept: PSYCHOLOGY | Age: 47
End: 2022-10-17

## 2022-10-17 DIAGNOSIS — F43.10 POSTTRAUMATIC STRESS DISORDER: Primary | ICD-10-CM

## 2022-10-17 DIAGNOSIS — F32.1 MAJOR DEPRESSIVE DISORDER, SINGLE EPISODE, MODERATE (HCC): ICD-10-CM

## 2022-10-20 ENCOUNTER — TELEMEDICINE (OUTPATIENT)
Dept: PSYCHOLOGY | Age: 47
End: 2022-10-20

## 2022-10-20 DIAGNOSIS — F43.10 POSTTRAUMATIC STRESS DISORDER: Primary | ICD-10-CM

## 2022-10-20 DIAGNOSIS — F32.1 MAJOR DEPRESSIVE DISORDER, SINGLE EPISODE, MODERATE (HCC): ICD-10-CM

## 2022-10-20 PROCEDURE — 90832 PSYTX W PT 30 MINUTES: CPT | Performed by: PSYCHOLOGIST
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
## 2022-11-07 ENCOUNTER — TELEMEDICINE (OUTPATIENT)
Dept: PSYCHOLOGY | Age: 47
End: 2022-11-07
Payer: COMMERCIAL

## 2022-11-07 DIAGNOSIS — F32.1 MAJOR DEPRESSIVE DISORDER, SINGLE EPISODE, MODERATE (HCC): ICD-10-CM

## 2022-11-07 DIAGNOSIS — F43.10 POSTTRAUMATIC STRESS DISORDER: Primary | ICD-10-CM

## 2022-11-07 PROCEDURE — 90832 PSYTX W PT 30 MINUTES: CPT | Performed by: PSYCHOLOGIST

## 2022-11-07 ASSESSMENT — PATIENT HEALTH QUESTIONNAIRE - PHQ9
SUM OF ALL RESPONSES TO PHQ9 QUESTIONS 1 & 2: 4
7. TROUBLE CONCENTRATING ON THINGS, SUCH AS READING THE NEWSPAPER OR WATCHING TELEVISION: 1
10. IF YOU CHECKED OFF ANY PROBLEMS, HOW DIFFICULT HAVE THESE PROBLEMS MADE IT FOR YOU TO DO YOUR WORK, TAKE CARE OF THINGS AT HOME, OR GET ALONG WITH OTHER PEOPLE: 1
1. LITTLE INTEREST OR PLEASURE IN DOING THINGS: 2
SUM OF ALL RESPONSES TO PHQ QUESTIONS 1-9: 13
6. FEELING BAD ABOUT YOURSELF - OR THAT YOU ARE A FAILURE OR HAVE LET YOURSELF OR YOUR FAMILY DOWN: 1
4. FEELING TIRED OR HAVING LITTLE ENERGY: 3
SUM OF ALL RESPONSES TO PHQ QUESTIONS 1-9: 13
8. MOVING OR SPEAKING SO SLOWLY THAT OTHER PEOPLE COULD HAVE NOTICED. OR THE OPPOSITE, BEING SO FIGETY OR RESTLESS THAT YOU HAVE BEEN MOVING AROUND A LOT MORE THAN USUAL: 1
2. FEELING DOWN, DEPRESSED OR HOPELESS: 2
9. THOUGHTS THAT YOU WOULD BE BETTER OFF DEAD, OR OF HURTING YOURSELF: 0
SUM OF ALL RESPONSES TO PHQ QUESTIONS 1-9: 13
3. TROUBLE FALLING OR STAYING ASLEEP: 3
5. POOR APPETITE OR OVEREATING: 0
SUM OF ALL RESPONSES TO PHQ QUESTIONS 1-9: 13

## 2022-11-07 ASSESSMENT — ANXIETY QUESTIONNAIRES
2. NOT BEING ABLE TO STOP OR CONTROL WORRYING: 1
5. BEING SO RESTLESS THAT IT IS HARD TO SIT STILL: 1
1. FEELING NERVOUS, ANXIOUS, OR ON EDGE: 1
3. WORRYING TOO MUCH ABOUT DIFFERENT THINGS: 1
GAD7 TOTAL SCORE: 8
4. TROUBLE RELAXING: 1
7. FEELING AFRAID AS IF SOMETHING AWFUL MIGHT HAPPEN: 2
6. BECOMING EASILY ANNOYED OR IRRITABLE: 1
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
## 2022-11-22 ENCOUNTER — OFFICE VISIT (OUTPATIENT)
Dept: ORTHOPEDIC SURGERY | Age: 47
End: 2022-11-22
Payer: MEDICARE

## 2022-11-22 ENCOUNTER — TELEMEDICINE (OUTPATIENT)
Dept: PSYCHOLOGY | Age: 47
End: 2022-11-22

## 2022-11-22 VITALS — WEIGHT: 210 LBS | BODY MASS INDEX: 28.44 KG/M2 | HEIGHT: 72 IN

## 2022-11-22 DIAGNOSIS — F32.1 MAJOR DEPRESSIVE DISORDER, SINGLE EPISODE, MODERATE (HCC): ICD-10-CM

## 2022-11-22 DIAGNOSIS — F43.10 POSTTRAUMATIC STRESS DISORDER: Primary | ICD-10-CM

## 2022-11-22 DIAGNOSIS — F07.81 POST CONCUSSION SYNDROME: Primary | ICD-10-CM

## 2022-11-22 PROCEDURE — 99203 OFFICE O/P NEW LOW 30 MIN: CPT | Performed by: FAMILY MEDICINE

## 2022-11-22 RX ORDER — LORATADINE 10 MG/1
TABLET ORAL
COMMUNITY
Start: 2022-09-28

## 2022-11-22 RX ORDER — AMITRIPTYLINE HYDROCHLORIDE 25 MG/1
25 TABLET, FILM COATED ORAL NIGHTLY
Qty: 30 TABLET | Refills: 0 | Status: SHIPPED | OUTPATIENT
Start: 2022-11-22
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
## 2022-12-08 ENCOUNTER — TELEMEDICINE (OUTPATIENT)
Dept: PSYCHOLOGY | Age: 47
End: 2022-12-08

## 2022-12-08 DIAGNOSIS — F43.10 POSTTRAUMATIC STRESS DISORDER: Primary | ICD-10-CM

## 2022-12-08 DIAGNOSIS — F32.1 MAJOR DEPRESSIVE DISORDER, SINGLE EPISODE, MODERATE (HCC): ICD-10-CM
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
## 2022-12-28 ENCOUNTER — TELEMEDICINE (OUTPATIENT)
Dept: PSYCHOLOGY | Age: 47
End: 2022-12-28

## 2022-12-28 DIAGNOSIS — F43.10 POSTTRAUMATIC STRESS DISORDER: Primary | ICD-10-CM

## 2022-12-28 DIAGNOSIS — F32.1 MAJOR DEPRESSIVE DISORDER, SINGLE EPISODE, MODERATE (HCC): ICD-10-CM
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
## 2023-01-02 DIAGNOSIS — F07.81 POST CONCUSSION SYNDROME: ICD-10-CM

## 2023-01-03 ENCOUNTER — OFFICE VISIT (OUTPATIENT)
Dept: ORTHOPEDIC SURGERY | Age: 48
End: 2023-01-03
Payer: COMMERCIAL

## 2023-01-03 VITALS — HEIGHT: 72 IN | WEIGHT: 210 LBS | BODY MASS INDEX: 28.44 KG/M2

## 2023-01-03 DIAGNOSIS — F07.81 POST CONCUSSION SYNDROME: Primary | ICD-10-CM

## 2023-01-03 PROCEDURE — 99213 OFFICE O/P EST LOW 20 MIN: CPT | Performed by: FAMILY MEDICINE

## 2023-01-03 RX ORDER — MECLIZINE HYDROCHLORIDE 25 MG/1
25 TABLET ORAL 3 TIMES DAILY PRN
Qty: 90 TABLET | Refills: 0 | Status: SHIPPED | OUTPATIENT
Start: 2023-01-03 | End: 2023-02-02

## 2023-01-03 RX ORDER — AMITRIPTYLINE HYDROCHLORIDE 25 MG/1
25 TABLET, FILM COATED ORAL NIGHTLY
Qty: 90 TABLET | Refills: 1 | Status: SHIPPED | OUTPATIENT
Start: 2023-01-03

## 2023-01-03 NOTE — PROGRESS NOTES
[facility]  PRIMARY CARE SPORTS MEDICINE  DATE OF VISIT : 1/3/2023    Patient : Luis Antonio Oneil  Age : 41 y.o.  : 1975  MRN : [id number]   ______________________________________________________________________    Chief Complaint :   Chief Complaint   Patient presents with    Concussion     Patient states that he is having some relief of symptoms but is still having trouble standing for extended periods of time due to dizziness and headaches. Patient believes that meds are helping with symptoms. HPI : Luis Antonio Oneil is 41 y.o. male who presented to the clinic today for medical management of concussion diagnosed on 3/24/2019. Headaches improved with Elavil. Patient continues to complain of dizziness especially with positional change.     Past Medical History :  Past Medical History:   Diagnosis Date    Cardiomyopathy, hypertrophic ([address]     Eye disorder     right    Hyperlipidemia     Hypertrophic cardiomyopathy ([address]     Low back pain     Major depressive disorder     Neck pain     PTSD (post-traumatic stress disorder)      Past Surgical History:   Procedure Laterality Date    EYE MUSCLE SURGERY Right 2019    RESECTION AND RECESSION RIGHT EYE FOR 35 EXOTROPIA --RIGHT EYE MUSCLE SURGERY performed by Leticia Stephens MD at [address] [place] [address] Left 2020    cervical epidural steroid injection C7-T1 left paramedian    PAIN MANAGEMENT PROCEDURE Left 2020    CERVICAL EPIDURAL STEROID INJECTION C7-T1 LEFT PARAMEDIAN (CPT 84470) performed by Maldonado Yeager MD at [address] [place] [address]       Allergies :   No Known Allergies    Medication List :    Current Outpatient Medications   Medication Sig Dispense Refill    loratadine (CLARITIN) 10 MG tablet TAKE 1 TABLET BY MOUTH EVERY DAY      amitriptyline (ELAVIL) 25 MG tablet Take 1 tablet by mouth nightly 30 tablet 0    aspirin 81 MG chewable tablet CHEW AND SWALLOW 1 TABLET BY MOUTH EVERY DAY      buPROPion (WELLBUTRIN XL) 150 MG extended release tablet Take by mouth      buPROPion (WELLBUTRIN XL) 300 MG extended release tablet Take by mouth      terazosin (HYTRIN) 10 MG capsule Take 1 capsule by mouth      metoprolol succinate (TOPROL XL) 100 MG extended release tablet Take 1 tablet by mouth 2 times daily 60 tablet 11    atorvastatin (LIPITOR) 40 MG tablet Take 1 tablet by mouth daily 30 tablet 11    prazosin (MINIPRESS) 2 MG capsule Take 2 mg by mouth nightly      omeprazole (PRILOSEC) 20 MG delayed release capsule Take 20 mg by mouth   0    Lutein 6 MG CAPS Take 1 tablet by mouth daily Ld 1/21/2019      Multiple Vitamins-Minerals (THERAPEUTIC MULTIVITAMIN-MINERALS) tablet Take 1 tablet by mouth daily Ld 1/21/2019       No current facility-administered medications for this visit. Review of Systems    Headache Yes   Nausea  Yes   Vomiting No   Balance problems Yes   Dizziness Yes   Fatigue Yes   Trouble falling asleep Yes   Sleeping more than usual No   Sleeping less than usual Yes   Drowsiness Yes   Sensitivity to light Yes   Sensitivity to noise No   Irritability Yes   Sadness Yes   Nervousness Yes   Feeling more emotional Yes   Numbness or tingling Yes   Feeling slowed down No   Feeling mentally foggy No   Difficulty concentrating Yes   Difficulty remembering Yes   Visual problems No     ______________________________________________________________________    Physical Exam :    Vitals: Ht 6' (1.829 m)   Wt 210 lb (95.3 kg)   BMI 28.48 kg/m²   General Appearance: Nontoxic, awake, alert, and in no acute distress. Chest wall/Lung: Respirations regular and unlabored. No cyanosis. Heart: RRR, distal pulses intact. Neurologic: Alert&Oriented x3. Sensation grossly intact. No focal motor deficits detected.  ______________________________________________________________________    Assessment & Plan :    1.  Post concussion syndrome  Continue oral amitriptyline for headaches management  Encourage patient to track a headache diary  Trial of oral meclizine for treatment of dizziness  Encourage patient to follow-up with physician of record for other treatment modalities such as vestibulocochlear therapy    - amitriptyline (ELAVIL) 25 MG tablet; Take 1 tablet by mouth nightly  Dispense: 90 tablet; Refill: 1  - meclizine (ANTIVERT) 25 MG tablet; Take 1 tablet by mouth 3 times daily as needed for Dizziness  Dispense: 90 tablet; Refill: 0     Return to Office: Return if symptoms worsen or fail to improve.     Maye Rooney MD

## 2023-01-16 ENCOUNTER — TELEMEDICINE (OUTPATIENT)
Dept: PSYCHOLOGY | Age: 48
End: 2023-01-16

## 2023-01-16 DIAGNOSIS — F32.1 MAJOR DEPRESSIVE DISORDER, SINGLE EPISODE, MODERATE (HCC): ICD-10-CM

## 2023-01-16 DIAGNOSIS — F43.10 POSTTRAUMATIC STRESS DISORDER: Primary | ICD-10-CM

## 2023-01-16 DIAGNOSIS — F07.81 POST CONCUSSION SYNDROME: ICD-10-CM
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
## 2023-02-02 ENCOUNTER — TELEMEDICINE (OUTPATIENT)
Dept: PSYCHOLOGY | Age: 48
End: 2023-02-02

## 2023-02-02 DIAGNOSIS — F43.10 POSTTRAUMATIC STRESS DISORDER: Primary | ICD-10-CM

## 2023-02-02 DIAGNOSIS — F32.1 MAJOR DEPRESSIVE DISORDER, SINGLE EPISODE, MODERATE (HCC): ICD-10-CM

## 2023-02-02 NOTE — PSYCHOTHERAPY
His wife is a nursing supervisor at [employer]. She works long hours and extended overtime. She is also exposed to numerous stressful situations. Some degree of secondary trauma for Brittney Gayle knowing that she is in this situation. Also prevents them from having the time to look for a new house. He feels she may consider leaving the job and he wants her to be happy, but also hopes she will find something else. Discussed how to talk with her about this issue and how her job affects him. As well as the fact that he may be able to achieve further improvements if she is in a different job.
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
## 2023-02-08 RX ORDER — AMITRIPTYLINE HYDROCHLORIDE 25 MG/1
TABLET, FILM COATED ORAL
Qty: 30 TABLET | Refills: 0 | Status: SHIPPED | OUTPATIENT
Start: 2023-02-08

## 2023-02-20 ENCOUNTER — TELEMEDICINE (OUTPATIENT)
Dept: PSYCHOLOGY | Age: 48
End: 2023-02-20

## 2023-02-20 DIAGNOSIS — F43.10 POSTTRAUMATIC STRESS DISORDER: Primary | ICD-10-CM

## 2023-02-20 DIAGNOSIS — F32.1 MAJOR DEPRESSIVE DISORDER, SINGLE EPISODE, MODERATE (HCC): ICD-10-CM
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
## 2023-03-08 ENCOUNTER — TELEMEDICINE (OUTPATIENT)
Dept: PSYCHOLOGY | Age: 48
End: 2023-03-08

## 2023-03-08 DIAGNOSIS — F32.1 MAJOR DEPRESSIVE DISORDER, SINGLE EPISODE, MODERATE (HCC): ICD-10-CM

## 2023-03-08 DIAGNOSIS — F43.10 POSTTRAUMATIC STRESS DISORDER: Primary | ICD-10-CM

## 2023-03-08 RX ORDER — METOPROLOL SUCCINATE 100 MG/1
100 TABLET, EXTENDED RELEASE ORAL 2 TIMES DAILY
Qty: 60 TABLET | Refills: 5 | Status: SHIPPED | OUTPATIENT
Start: 2023-03-08
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
## 2023-03-13 DIAGNOSIS — F07.81 POST CONCUSSION SYNDROME: ICD-10-CM

## 2023-03-20 RX ORDER — MECLIZINE HYDROCHLORIDE 25 MG/1
25 TABLET ORAL 3 TIMES DAILY PRN
Qty: 90 TABLET | Refills: 0 | Status: SHIPPED | OUTPATIENT
Start: 2023-03-20 | End: 2023-04-19

## 2023-03-20 RX ORDER — AMITRIPTYLINE HYDROCHLORIDE 25 MG/1
TABLET, FILM COATED ORAL
Qty: 30 TABLET | Refills: 0 | Status: SHIPPED | OUTPATIENT
Start: 2023-03-20

## 2023-03-23 ENCOUNTER — OFFICE VISIT (OUTPATIENT)
Dept: CARDIOLOGY CLINIC | Age: 48
End: 2023-03-23
Payer: MEDICARE

## 2023-03-23 VITALS
BODY MASS INDEX: 30.48 KG/M2 | HEART RATE: 69 BPM | RESPIRATION RATE: 12 BRPM | DIASTOLIC BLOOD PRESSURE: 82 MMHG | HEIGHT: 72 IN | WEIGHT: 225 LBS | SYSTOLIC BLOOD PRESSURE: 112 MMHG

## 2023-03-23 DIAGNOSIS — I42.1 CARDIOMYOPATHY, HYPERTROPHIC OBSTRUCTIVE (HCC): Primary | ICD-10-CM

## 2023-03-23 DIAGNOSIS — R06.09 DYSPNEA ON EXERTION: ICD-10-CM

## 2023-03-23 DIAGNOSIS — E78.00 HYPERCHOLESTEROLEMIA: ICD-10-CM

## 2023-03-23 PROCEDURE — 99213 OFFICE O/P EST LOW 20 MIN: CPT | Performed by: INTERNAL MEDICINE

## 2023-03-23 PROCEDURE — 93000 ELECTROCARDIOGRAM COMPLETE: CPT | Performed by: INTERNAL MEDICINE

## 2023-03-23 RX ORDER — ASPIRIN 81 MG/1
TABLET, CHEWABLE ORAL
Qty: 90 TABLET | Refills: 3 | Status: SHIPPED | OUTPATIENT
Start: 2023-03-23

## 2023-03-23 RX ORDER — METOPROLOL SUCCINATE 100 MG/1
100 TABLET, EXTENDED RELEASE ORAL 2 TIMES DAILY
Qty: 180 TABLET | Refills: 3 | Status: SHIPPED | OUTPATIENT
Start: 2023-03-23

## 2023-03-23 NOTE — PROGRESS NOTES
7/26/2019 without CAD. Echo on 11/25/2019 with severe septal asymmetric left ventricular hypertrophy. Discussed issues that would prompt earlier evaluation. Same cardiac medications. Follow-up office visit in 1 year.

## 2023-03-27 ENCOUNTER — TELEMEDICINE (OUTPATIENT)
Dept: PSYCHOLOGY | Age: 48
End: 2023-03-27

## 2023-03-27 DIAGNOSIS — F43.10 POSTTRAUMATIC STRESS DISORDER: Primary | ICD-10-CM

## 2023-03-27 DIAGNOSIS — F32.1 MAJOR DEPRESSIVE DISORDER, SINGLE EPISODE, MODERATE (HCC): ICD-10-CM
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
## 2023-03-29 ENCOUNTER — NURSE ONLY (OUTPATIENT)
Dept: CARDIOLOGY CLINIC | Age: 48
End: 2023-03-29

## 2023-03-29 NOTE — PROGRESS NOTES
Patient was seen today and a 3 Day XT monitor was placed. Monitor was ordered by Dr. Marry Rivas. The monitor was applied, instructions were given to the patient. Patient stated understanding and gave verbalize readback.    Monitor company: [employer]  Serial number: [id number]

## 2023-04-07 ENCOUNTER — TELEPHONE (OUTPATIENT)
Dept: CARDIOLOGY CLINIC | Age: 48
End: 2023-04-07

## 2023-04-07 DIAGNOSIS — R06.09 DYSPNEA ON EXERTION: ICD-10-CM

## 2023-04-07 DIAGNOSIS — I42.1 CARDIOMYOPATHY, HYPERTROPHIC OBSTRUCTIVE (HCC): ICD-10-CM

## 2023-04-07 NOTE — TELEPHONE ENCOUNTER
----- Message from Jessica Fox MD sent at 4/7/2023  1:45 PM EDT -----  Please let patient know that monitor looked good. Same medications.

## 2023-04-18 DIAGNOSIS — F07.81 POST CONCUSSION SYNDROME: ICD-10-CM

## 2023-04-27 ENCOUNTER — TELEPHONE (OUTPATIENT)
Dept: CARDIOLOGY | Age: 48
End: 2023-04-27

## 2023-05-01 ENCOUNTER — TELEMEDICINE (OUTPATIENT)
Dept: PSYCHOLOGY | Age: 48
End: 2023-05-01

## 2023-05-01 DIAGNOSIS — F43.10 POSTTRAUMATIC STRESS DISORDER: Primary | ICD-10-CM

## 2023-05-01 DIAGNOSIS — F32.1 MAJOR DEPRESSIVE DISORDER, SINGLE EPISODE, MODERATE (HCC): ICD-10-CM

## 2023-05-01 RX ORDER — AMITRIPTYLINE HYDROCHLORIDE 25 MG/1
TABLET, FILM COATED ORAL
Qty: 30 TABLET | Refills: 0 | Status: SHIPPED | OUTPATIENT
Start: 2023-05-01
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
## 2023-05-18 ENCOUNTER — TELEMEDICINE (OUTPATIENT)
Dept: PSYCHOLOGY | Age: 48
End: 2023-05-18

## 2023-05-18 DIAGNOSIS — F32.1 MAJOR DEPRESSIVE DISORDER, SINGLE EPISODE, MODERATE (HCC): ICD-10-CM

## 2023-05-18 DIAGNOSIS — F43.10 POSTTRAUMATIC STRESS DISORDER: Primary | ICD-10-CM

## 2023-05-18 NOTE — PROGRESS NOTES
[facility]    Time Start: 2:00 pm    Time End:  2:20 pm  Date of Service:  5/18/2023     THIS IS A WORKER'S COMPENSATION VISIT. PLEASE BILL TO ELIZABETH. CLAIM # [id number]. DOI = 3/24/19. C9 AUTHORIZATION  (12 THERAPY SESSIONS 3/8/23 to 9/8/23)    This visit was performed as a virtual video visit using a synchronous, two-way, audio-video telehealth technology platform. Patient does agree to proceed with telemedicine consultation. Patient's location: [place] [address]    Provider location: [address] [facility] modifier to all Video Visits*      Subjective: He has started biweekly chiropractic visits and is starting to feel relief. Has made contact with his landlord about moving to a safer house, has not yet gotten a response but plans to follow-up again next week. He has had URI for the past few days. Objective:    Mental Status:    Appearance: casually dressed   Affect:  Mood congruent   Attitude: Cooperative   Mood:   Dysphoric   Thought Process:  Linear and goal directed   Delusions: no evidence of delusions   Perceptions: No perceptual disturbance   Behavior:   Cooperative   Psychomotor: WNL   Speech:   Soft   Eye Contact: good   Orientation:  oriented to person, place, time, and general circumstances   Judgment & Insight:  normal insight and judgment       Risk Assessment:  Current Suicide & Homicide Risk:  Focused assessment deferred, prior evaluation yielded minimal suicidal/homicidal risk and there are no new circumstances to warrant reassessment. Protective Factors: Support from family; Voodoo convictions      Diagnosis:    ICD-10-CM    1. Posttraumatic stress disorder  F43.10       2.  Major depressive disorder, single episode, moderate (HCC)  F32.1               Psychotherapy Intervention:  Review of Symptoms, Cognitive Behavioral

## 2023-05-25 ENCOUNTER — HOSPITAL ENCOUNTER (OUTPATIENT)
Dept: CARDIOLOGY | Age: 48
Discharge: HOME | End: 2023-05-25
Payer: MEDICARE

## 2023-05-25 DIAGNOSIS — I42.1 CARDIOMYOPATHY, HYPERTROPHIC OBSTRUCTIVE (HCC): ICD-10-CM

## 2023-05-25 LAB
LV EF: 65 %
LVEF MODALITY: NORMAL

## 2023-05-25 PROCEDURE — 93306 TTE W/DOPPLER COMPLETE: CPT

## 2023-05-26 ENCOUNTER — TELEPHONE (OUTPATIENT)
Dept: CARDIOLOGY CLINIC | Age: 48
End: 2023-05-26

## 2023-05-26 NOTE — TELEPHONE ENCOUNTER
----- Message from Kareem Thompson MD sent at 5/26/2023  2:40 PM EDT -----  Please let patient know:  echo similar to last study. Continue same medications. Follow up in one year.

## 2023-06-05 ENCOUNTER — TELEMEDICINE (OUTPATIENT)
Dept: PSYCHOLOGY | Age: 48
End: 2023-06-05

## 2023-06-05 DIAGNOSIS — F32.1 MAJOR DEPRESSIVE DISORDER, SINGLE EPISODE, MODERATE (HCC): ICD-10-CM

## 2023-06-05 DIAGNOSIS — F43.10 POSTTRAUMATIC STRESS DISORDER: Primary | ICD-10-CM

## 2023-06-05 NOTE — PROGRESS NOTES
[facility]    Time Start: 11:35 am    Time End:  11:55 am  Date of Service:  6/5/2023     THIS IS A WORKER'S COMPENSATION VISIT. PLEASE BILL TO ELIZABETH. CLAIM # [id number]. DOI = 3/24/19. C9 AUTHORIZATION  (12 THERAPY SESSIONS 3/8/23 to 9/8/23)      Fercho Blanco, was evaluated through a synchronous (real-time) audio-video encounter. The patient (and/or guardian if applicable) is aware that this is a billable service, which includes applicable co-pays. This virtual visit was conducted with patient's (and/or legal guardian's) consent. Patient identification was verified, and a caregiver was present when appropriate. The patient was located at Home: [address]  The provider was located at [facility] Dept): [address] [place] [address] 64  [id number] [place] [address]  [phone]     Total time spent on this encounter: 25 minutes    Subjective:    He has had Keratoconus for years, and last week woke up one morning with very blurry vision in his right eye Kenny Coyle [I] was looking through wax paper\"). He saw Dr. Froilan Lopez at [facility], who is referring him to [facility] clinic ophthalmology. States Dr. Froilan Lopez believes he will need a corneal transplant. Continues to report depressed mood, insomnia, and anxiety.     Objective:    Mental Status:    Appearance: casually dressed   Affect:  Mood congruent   Attitude: Cooperative   Mood:   Dysphoric   Thought Process:  Linear and goal directed   Delusions: no evidence of delusions   Perceptions: No perceptual disturbance   Behavior:   Cooperative   Psychomotor: WNL   Speech:   Soft   Eye Contact: good   Orientation:  oriented to person, place, time, and general circumstances   Judgment & Insight:  normal insight and judgment       Risk Assessment:  Current Suicide & Homicide Risk:  Focused assessment deferred,

## 2023-06-22 ENCOUNTER — TELEMEDICINE (OUTPATIENT)
Dept: PSYCHOLOGY | Age: 48
End: 2023-06-22

## 2023-06-22 DIAGNOSIS — F43.10 POSTTRAUMATIC STRESS DISORDER: Primary | ICD-10-CM

## 2023-06-22 DIAGNOSIS — F32.1 MAJOR DEPRESSIVE DISORDER, SINGLE EPISODE, MODERATE (HCC): ICD-10-CM
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
## 2023-07-11 ENCOUNTER — TELEMEDICINE (OUTPATIENT)
Dept: PSYCHOLOGY | Age: 48
End: 2023-07-11

## 2023-07-11 DIAGNOSIS — F43.10 POSTTRAUMATIC STRESS DISORDER: Primary | ICD-10-CM

## 2023-07-11 DIAGNOSIS — F32.1 MAJOR DEPRESSIVE DISORDER, SINGLE EPISODE, MODERATE (HCC): ICD-10-CM

## 2023-07-11 NOTE — PROGRESS NOTES
800 [facility] Medicine    Time Start: 1:00 pm    Time End:  1:25 pm  Date of Service:  7/11/2023     THIS IS A WORKER'S COMPENSATION VISIT. PLEASE BILL TO ELIZABETH. CLAIM # [id number]. DOI = 3/24/19. C9 AUTHORIZATION  (12 THERAPY SESSIONS 3/8/23 to 9/8/23)      Chivo Delong, was evaluated through a synchronous (real-time) audio-video encounter. The patient (and/or guardian if applicable) is aware that this is a billable service, which includes applicable co-pays. This virtual visit was conducted with patient's (and/or legal guardian's) consent. Patient identification was verified, and a caregiver was present when appropriate. The patient was located at Home: [address] [place] [address]  The provider was located at [facility] PSYCHIATRIC REHABILITATION CT Dept): [address] [place], [place] [id number] [phone]     Total time spent on this encounter: 30 minutes    Subjective:    Keratoconus flare up continues. Waiting on review from [facility] ophthalmology. Worsening of headaches. Objective:    Mental Status:    Appearance: casually dressed   Affect:  Mood congruent   Attitude: Cooperative   Mood:   Dysphoric   Thought Process:  Linear and goal directed   Delusions: no evidence of delusions   Perceptions: No perceptual disturbance   Behavior:   Cooperative   Psychomotor: WNL   Speech:   Soft   Eye Contact: good   Orientation:  oriented to person, place, time, and general circumstances   Judgment & Insight:  normal insight and judgment     Risk Assessment:  Current Suicide & Homicide Risk:  Focused assessment deferred, prior evaluation yielded minimal suicidal/homicidal risk and there are no new circumstances to warrant reassessment. Protective Factors: Support from family; Pentecostalism convictions      Diagnosis:    ICD-10-CM    1. Posttraumatic stress disorder  F43.10       2.  Major

## 2023-07-27 ENCOUNTER — TELEMEDICINE (OUTPATIENT)
Dept: PSYCHOLOGY | Age: 48
End: 2023-07-27
Payer: COMMERCIAL

## 2023-07-27 DIAGNOSIS — F43.10 POSTTRAUMATIC STRESS DISORDER: Primary | ICD-10-CM

## 2023-07-27 DIAGNOSIS — F32.1 MAJOR DEPRESSIVE DISORDER, SINGLE EPISODE, MODERATE (HCC): ICD-10-CM

## 2023-07-27 PROCEDURE — 90832 PSYTX W PT 30 MINUTES: CPT | Performed by: PSYCHOLOGIST
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
## 2023-08-10 DIAGNOSIS — F07.81 POST CONCUSSION SYNDROME: ICD-10-CM

## 2023-08-10 RX ORDER — AMITRIPTYLINE HYDROCHLORIDE 50 MG/1
50 TABLET, FILM COATED ORAL NIGHTLY
Qty: 30 TABLET | Refills: 2 | Status: SHIPPED | OUTPATIENT
Start: 2023-08-10

## 2023-08-14 ENCOUNTER — TELEMEDICINE (OUTPATIENT)
Dept: PSYCHOLOGY | Age: 48
End: 2023-08-14
Payer: COMMERCIAL

## 2023-08-14 DIAGNOSIS — F43.10 POSTTRAUMATIC STRESS DISORDER: Primary | ICD-10-CM

## 2023-08-14 DIAGNOSIS — F32.1 MAJOR DEPRESSIVE DISORDER, SINGLE EPISODE, MODERATE (HCC): ICD-10-CM

## 2023-08-14 PROCEDURE — 90832 PSYTX W PT 30 MINUTES: CPT | Performed by: PSYCHOLOGIST

## 2023-08-15 NOTE — PROGRESS NOTES
800 [facility] Medicine    Time Start: 2:30 pm   Time End:  3:00 pm  Date of Service:  8/14/2023     THIS IS A WORKER'S COMPENSATION VISIT. PLEASE BILL TO ELIZABETH. CLAIM # [id number]. DOI = 3/24/19. C9 AUTHORIZATION  (12 THERAPY SESSIONS 3/8/23 to 9/8/23)      Lito Delong, was evaluated through a synchronous (real-time) audio-video encounter. The patient (and/or guardian if applicable) is aware that this is a billable service, which includes applicable co-pays. This virtual visit was conducted with patient's (and/or legal guardian's) consent. Patient identification was verified, and a caregiver was present when appropriate. The patient was located at Home: [address] [place] [address]  The provider was located at [facility] PSYCHIATRIC REHABILITATION CT Dept): [address] [place] [address] Drive [address] [facility],2[address] Floor  [phone]     Total time spent on this encounter: 30 minutes    Subjective:    Continuing to work on finding new living situation. Objective:    Mental Status:    Appearance: casually dressed   Affect:  Mood congruent   Attitude: Cooperative   Mood:   Dysphoric   Thought Process:  Linear and goal directed   Delusions: no evidence of delusions   Perceptions: No perceptual disturbance   Behavior:   Cooperative   Psychomotor: WNL   Speech:   Soft   Eye Contact: good   Orientation:  oriented to person, place, time, and general circumstances   Judgment & Insight:  normal insight and judgment     Risk Assessment:  Current Suicide & Homicide Risk:  Focused assessment deferred, prior evaluation yielded minimal suicidal/homicidal risk and there are no new circumstances to warrant reassessment. Protective Factors: Support from family; Voodoo convictions      Diagnosis:    ICD-10-CM    1. Posttraumatic stress disorder  F43.10       2.  Major depressive disorder, single episode, moderate (HCC)  F32.1

## 2023-08-28 ENCOUNTER — TELEMEDICINE (OUTPATIENT)
Dept: PSYCHOLOGY | Age: 48
End: 2023-08-28
Payer: COMMERCIAL

## 2023-08-28 DIAGNOSIS — F32.1 MAJOR DEPRESSIVE DISORDER, SINGLE EPISODE, MODERATE (HCC): ICD-10-CM

## 2023-08-28 DIAGNOSIS — F43.10 POSTTRAUMATIC STRESS DISORDER: Primary | ICD-10-CM

## 2023-08-28 PROCEDURE — 90832 PSYTX W PT 30 MINUTES: CPT | Performed by: PSYCHOLOGIST

## 2023-08-28 NOTE — PROGRESS NOTES
Chiropractic care.     Plan:  Follow up in 2 weeks, 9/13/23    Electronically signed by Johnny Joseph, PhD

## 2023-09-13 ENCOUNTER — TELEMEDICINE (OUTPATIENT)
Dept: PSYCHOLOGY | Age: 48
End: 2023-09-13

## 2023-09-13 DIAGNOSIS — F43.10 POSTTRAUMATIC STRESS DISORDER: Primary | ICD-10-CM

## 2023-09-13 DIAGNOSIS — F32.1 MAJOR DEPRESSIVE DISORDER, SINGLE EPISODE, MODERATE (HCC): ICD-10-CM

## 2023-09-13 NOTE — PROGRESS NOTES
[address] [facility] Medicine    Time Start: 1:00 pm   Time End:  1:25 pm  Date of Service:  9/13/2023     THIS IS A WORKER'S COMPENSATION VISIT. PLEASE BILL TO ELIZABETH. CLAIM # [id number]. DOI = 3/24/19. C9 AUTHORIZATION  (12 THERAPY SESSIONS 3/8/23 to 9/13/23 - with end date extension)      Sahil Land, was evaluated through a synchronous (real-time) audio-video encounter. The patient (and/or guardian if applicable) is aware that this is a billable service, which includes applicable co-pays. This virtual visit was conducted with patient's (and/or legal guardian's) consent. Patient identification was verified, and a caregiver was present when appropriate. The patient was located at Home: [address] [place] [address]  The provider was located at [facility] ([address]): [address] [place] [address] Drive [address] [facility],2[address] Floor  [phone]     Total time spent on this encounter: 25 minutes    Subjective:    Frustration due to not being able to find a house to purchase. Objective:    Mental Status:    Appearance: casually dressed   Affect:  Mood congruent   Attitude: Cooperative   Mood:   Dysphoric   Thought Process:  Linear and goal directed   Delusions: no evidence of delusions   Perceptions: No perceptual disturbance   Behavior:   Cooperative   Psychomotor: WNL   Speech:   Soft   Eye Contact: good   Orientation:  oriented to person, place, time, and general circumstances   Judgment & Insight:  normal insight and judgment     Risk Assessment:  Current Suicide & Homicide Risk:  Focused assessment deferred, prior evaluation yielded minimal suicidal/homicidal risk and there are no new circumstances to warrant reassessment. Protective Factors: Support from family; Pentecostalism convictions      Diagnosis:    ICD-10-CM    1. Posttraumatic stress disorder  F43.10       2.  Major depressive disorder, single

## 2023-09-14 DIAGNOSIS — F07.81 POST CONCUSSION SYNDROME: ICD-10-CM

## 2023-09-15 RX ORDER — MECLIZINE HYDROCHLORIDE 25 MG/1
25 TABLET ORAL 3 TIMES DAILY PRN
Qty: 90 TABLET | Refills: 0 | Status: SHIPPED | OUTPATIENT
Start: 2023-09-15 | End: 2023-10-15

## 2023-09-28 ENCOUNTER — TELEMEDICINE (OUTPATIENT)
Dept: PSYCHOLOGY | Age: 48
End: 2023-09-28
Payer: COMMERCIAL

## 2023-09-28 DIAGNOSIS — F43.10 POSTTRAUMATIC STRESS DISORDER: Primary | ICD-10-CM

## 2023-09-28 DIAGNOSIS — F32.1 MAJOR DEPRESSIVE DISORDER, SINGLE EPISODE, MODERATE (HCC): ICD-10-CM

## 2023-09-28 PROCEDURE — 90834 PSYTX W PT 45 MINUTES: CPT | Performed by: PSYCHOLOGIST
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
## 2023-10-16 ENCOUNTER — TELEMEDICINE (OUTPATIENT)
Dept: PSYCHOLOGY | Age: 48
End: 2023-10-16
Payer: COMMERCIAL

## 2023-10-16 DIAGNOSIS — F32.1 MAJOR DEPRESSIVE DISORDER, SINGLE EPISODE, MODERATE (HCC): ICD-10-CM

## 2023-10-16 DIAGNOSIS — F43.10 POSTTRAUMATIC STRESS DISORDER: Primary | ICD-10-CM

## 2023-10-16 PROCEDURE — 99443 PR PHYS/QHP TELEPHONE EVALUATION 21-30 MIN: CPT | Performed by: PSYCHOLOGIST
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
## 2023-11-01 ENCOUNTER — TELEMEDICINE (OUTPATIENT)
Dept: PSYCHOLOGY | Age: 48
End: 2023-11-01

## 2023-11-01 DIAGNOSIS — F32.1 MAJOR DEPRESSIVE DISORDER, SINGLE EPISODE, MODERATE (HCC): ICD-10-CM

## 2023-11-01 DIAGNOSIS — F43.10 POSTTRAUMATIC STRESS DISORDER: Primary | ICD-10-CM

## 2023-11-03 NOTE — PROGRESS NOTES
800 [facility]    Time Start: 10:00 am   Time End:  10:30 am  Date of Service:  11/1/2023     THIS IS A WORKER'S COMPENSATION VISIT. PLEASE BILL TO ELIZABETH. CLAIM # [id number]. DOI = 3/24/19. C9 AUTHORIZATION  (12 THERAPY SESSIONS 9/28/23 to 3/28/24)      Sahil Land, was evaluated through a synchronous (real-time) audio-video encounter. The patient (and/or guardian if applicable) is aware that this is a billable service, which includes applicable co-pays. This virtual visit was conducted with patient's (and/or legal guardian's) consent. Patient identification was verified, and a caregiver was present when appropriate. The patient was located at Home: [address] [place] [address]  The provider was located at [facility] PSYCHIATRIC REHABILITATION CT Dept): [address] [place] [address] Drive [address] [facility],2[address] Floor  [phone]     Total time spent on this encounter: 30 minutes    Subjective:  Put in offer on house, waiting on response. Objective:    Mental Status:    Appearance: casually dressed   Affect:  Mood congruent   Attitude: Cooperative   Mood:   Dysphoric, improving   Thought Process:  Linear and goal directed   Delusions: no evidence of delusions   Perceptions: No perceptual disturbance   Behavior:   Cooperative   Psychomotor: Slowed, pain behaviors   Speech:   Soft   Eye Contact: good   Orientation:  oriented to person, place, time, and general circumstances   Judgment & Insight:  normal insight and judgment         Risk Assessment:  Current Suicide & Homicide Risk:  Focused assessment deferred, prior evaluation yielded minimal suicidal/homicidal risk and there are no new circumstances to warrant reassessment. Protective Factors: Support from family; Anabaptism convictions      Diagnosis:    ICD-10-CM    1. Posttraumatic stress disorder  F43.10       2.  Major depressive disorder, single episode,

## 2023-11-10 DIAGNOSIS — F07.81 POST CONCUSSION SYNDROME: ICD-10-CM

## 2023-11-16 RX ORDER — AMITRIPTYLINE HYDROCHLORIDE 50 MG/1
50 TABLET, FILM COATED ORAL
Qty: 30 TABLET | Refills: 2 | Status: SHIPPED | OUTPATIENT
Start: 2023-11-16

## 2023-11-20 ENCOUNTER — TELEMEDICINE (OUTPATIENT)
Dept: PSYCHOLOGY | Age: 48
End: 2023-11-20
Payer: COMMERCIAL

## 2023-11-20 DIAGNOSIS — F32.1 MAJOR DEPRESSIVE DISORDER, SINGLE EPISODE, MODERATE (HCC): ICD-10-CM

## 2023-11-20 DIAGNOSIS — F43.10 POSTTRAUMATIC STRESS DISORDER: Primary | ICD-10-CM

## 2023-11-20 PROCEDURE — 90832 PSYTX W PT 30 MINUTES: CPT | Performed by: PSYCHOLOGIST
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
## 2023-12-06 ENCOUNTER — TELEMEDICINE (OUTPATIENT)
Dept: PSYCHOLOGY | Age: 48
End: 2023-12-06
Payer: COMMERCIAL

## 2023-12-06 DIAGNOSIS — F43.10 POSTTRAUMATIC STRESS DISORDER: Primary | ICD-10-CM

## 2023-12-06 DIAGNOSIS — F32.1 MAJOR DEPRESSIVE DISORDER, SINGLE EPISODE, MODERATE (HCC): ICD-10-CM

## 2023-12-06 PROCEDURE — 90832 PSYTX W PT 30 MINUTES: CPT | Performed by: PSYCHOLOGIST
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
## 2024-01-08 ENCOUNTER — TELEMEDICINE (OUTPATIENT)
Dept: PSYCHOLOGY | Age: 49
End: 2024-01-08
Payer: COMMERCIAL

## 2024-01-08 DIAGNOSIS — F32.1 MAJOR DEPRESSIVE DISORDER, SINGLE EPISODE, MODERATE (HCC): ICD-10-CM

## 2024-01-08 DIAGNOSIS — F43.10 POSTTRAUMATIC STRESS DISORDER: Primary | ICD-10-CM

## 2024-01-08 PROCEDURE — 90832 PSYTX W PT 30 MINUTES: CPT | Performed by: PSYCHOLOGIST
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
## 2024-01-24 ENCOUNTER — TELEMEDICINE (OUTPATIENT)
Dept: PSYCHOLOGY | Age: 49
End: 2024-01-24
Payer: COMMERCIAL

## 2024-01-24 DIAGNOSIS — F43.10 POSTTRAUMATIC STRESS DISORDER: Primary | ICD-10-CM

## 2024-01-24 DIAGNOSIS — F32.1 MAJOR DEPRESSIVE DISORDER, SINGLE EPISODE, MODERATE (HCC): ICD-10-CM

## 2024-01-24 PROCEDURE — 90832 PSYTX W PT 30 MINUTES: CPT | Performed by: PSYCHOLOGIST
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
## 2024-02-07 ENCOUNTER — TELEMEDICINE (OUTPATIENT)
Dept: PSYCHOLOGY | Age: 49
End: 2024-02-07
Payer: COMMERCIAL

## 2024-02-07 DIAGNOSIS — F43.10 POSTTRAUMATIC STRESS DISORDER: Primary | ICD-10-CM

## 2024-02-07 DIAGNOSIS — F32.1 MAJOR DEPRESSIVE DISORDER, SINGLE EPISODE, MODERATE (HCC): ICD-10-CM

## 2024-02-07 PROCEDURE — 90832 PSYTX W PT 30 MINUTES: CPT | Performed by: PSYCHOLOGIST
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
## 2024-03-07 ENCOUNTER — TELEMEDICINE (OUTPATIENT)
Dept: PSYCHOLOGY | Age: 49
End: 2024-03-07

## 2024-03-07 DIAGNOSIS — F32.1 MAJOR DEPRESSIVE DISORDER, SINGLE EPISODE, MODERATE (HCC): ICD-10-CM

## 2024-03-07 DIAGNOSIS — F43.10 POSTTRAUMATIC STRESS DISORDER: Primary | ICD-10-CM

## 2024-03-07 NOTE — PROGRESS NOTES
WORKER'S COMPENSATION  Behavioral Health Follow-Up  Manuel Villavicencio [facility]    Time Start: 11:30 am   Time End:  11:50 am  Date of Service:  3/7/2024     THIS IS A WORKER'S COMPENSATION VISIT. PLEASE BILL TO ELIZABETH.   CLAIM # [id number]. DOI = 3/24/19. C9 AUTHORIZATION  (12 THERAPY SESSIONS 9/28/23 to 3/28/24)      Jorge Alvarado, was evaluated through a synchronous (real-time) audio-video encounter. The patient (and/or guardian if applicable) is aware that this is a billable service, which includes applicable co-pays. This virtual visit was conducted with patient's (and/or legal guardian's) consent. Patient identification was verified, and a caregiver was present when appropriate.  The patient was located at Home:    [address] [place] [address]       The provider was located at Facility (Login Dept): [facility]  [address] [facility] [id number] [phone]     Total time spent on this encounter: 20 minutes    Subjective:    Getting adjusted to new home in a safer area.     Objective:    Mental Status:    Appearance: casually dressed   Affect:  Restricted   Attitude: Cooperative   Mood:   Hopeful   Thought Process:  Linear and goal directed   Delusions: no evidence of delusions   Perceptions: No perceptual disturbance   Behavior:   Cooperative   Psychomotor: WNL   Speech:   Soft   Eye Contact: good   Orientation:  oriented to person, place, time, and general circumstances   Judgment & Insight:  normal insight and judgment         Risk Assessment:  Current Suicide & Homicide Risk:  Focused assessment deferred, prior evaluation yielded minimal suicidal/homicidal risk and there are no new circumstances to warrant reassessment.    Protective Factors: Support from family; Sabianist convictions      Diagnosis:    ICD-10-CM    1. Posttraumatic stress disorder  F43.10       2. Major depressive disorder, single episode, moderate (HCC)  F32.1

## 2024-03-08 DIAGNOSIS — F07.81 POST CONCUSSION SYNDROME: ICD-10-CM

## 2024-03-11 RX ORDER — AMITRIPTYLINE HYDROCHLORIDE 50 MG/1
50 TABLET, FILM COATED ORAL
Qty: 30 TABLET | Refills: 2 | Status: SHIPPED | OUTPATIENT
Start: 2024-03-11

## 2024-03-21 ENCOUNTER — TELEMEDICINE (OUTPATIENT)
Dept: PSYCHOLOGY | Age: 49
End: 2024-03-21

## 2024-03-21 DIAGNOSIS — F43.10 POSTTRAUMATIC STRESS DISORDER: Primary | ICD-10-CM

## 2024-03-21 DIAGNOSIS — F32.1 MAJOR DEPRESSIVE DISORDER, SINGLE EPISODE, MODERATE (HCC): ICD-10-CM

## 2024-03-21 NOTE — PROGRESS NOTES
WORKER'S COMPENSATION  Behavioral Health Follow-Up  Manuel Villavicencio [facility]    Time Start: 11:00 am   Time End:  11:30 am  Date of Service:  3/21/2024     THIS IS A WORKER'S COMPENSATION VISIT. PLEASE BILL TO ELIZABETH.   CLAIM # [id number]. DOI = 3/24/19. C9 AUTHORIZATION  (12 THERAPY SESSIONS 23 to 3/28/24)      Jorge Alvarado, was evaluated through a synchronous (real-time) audio-video encounter. The patient (and/or guardian if applicable) is aware that this is a billable service, which includes applicable co-pays. This virtual visit was conducted with patient's (and/or legal guardian's) consent. Patient identification was verified, and a caregiver was present when appropriate.  The patient was located at Home:    [address] [place] [address]       The provider was located at Facility (Login Dept): [facility]   [facility] [id number] [phone]     Total time spent on this encounter: 30 minutes    Subjective:    A cousin (Zack, age 20) recently  of a drug overdose.  His brother Heber is ill with multiple co-morbidities secondary to prolonged substance abuse.    Objective:    Mental Status:    Appearance: casually dressed   Affect:  Restricted   Attitude: Cooperative   Mood:   Dysphoric   Thought Process:  Linear and goal directed   Delusions: no evidence of delusions   Perceptions: No perceptual disturbance   Behavior:   Cooperative   Psychomotor: WNL   Speech:   Soft   Eye Contact: good   Orientation:  oriented to person, place, time, and general circumstances   Judgment & Insight:  normal insight and judgment         Risk Assessment:  Current Suicide & Homicide Risk:  Focused assessment deferred, prior evaluation yielded minimal suicidal/homicidal risk and there are no new circumstances to warrant reassessment.    Protective Factors: Support from family; Mu-ism convictions      Diagnosis:    ICD-10-CM    1.

## 2024-03-26 ENCOUNTER — OFFICE VISIT (OUTPATIENT)
Dept: CARDIOLOGY CLINIC | Age: 49
End: 2024-03-26
Payer: MEDICARE

## 2024-03-26 VITALS
BODY MASS INDEX: 30.07 KG/M2 | WEIGHT: 222 LBS | RESPIRATION RATE: 16 BRPM | HEART RATE: 68 BPM | DIASTOLIC BLOOD PRESSURE: 74 MMHG | SYSTOLIC BLOOD PRESSURE: 118 MMHG | HEIGHT: 72 IN

## 2024-03-26 DIAGNOSIS — I42.1 CARDIOMYOPATHY, HYPERTROPHIC OBSTRUCTIVE (HCC): Primary | ICD-10-CM

## 2024-03-26 DIAGNOSIS — E78.00 HYPERCHOLESTEROLEMIA: ICD-10-CM

## 2024-03-26 DIAGNOSIS — R06.09 DYSPNEA ON EXERTION: ICD-10-CM

## 2024-03-26 PROCEDURE — 99214 OFFICE O/P EST MOD 30 MIN: CPT | Performed by: INTERNAL MEDICINE

## 2024-03-26 PROCEDURE — 93000 ELECTROCARDIOGRAM COMPLETE: CPT | Performed by: INTERNAL MEDICINE

## 2024-03-26 RX ORDER — METOPROLOL SUCCINATE 100 MG/1
100 TABLET, EXTENDED RELEASE ORAL 2 TIMES DAILY
Qty: 180 TABLET | Refills: 3 | Status: SHIPPED | OUTPATIENT
Start: 2024-03-26

## 2024-03-26 NOTE — PROGRESS NOTES
Patient was seen today and a 3 day EntrisphereO XT  monitor was placed. Monitor was ordered by . The monitor was applied, instructions were given to the patient. Patient stated understanding and gave verbalize readback.  Monitor company:[employer]  Serial number: [id number]

## 2024-03-26 NOTE — PROGRESS NOTES
Patient Active Problem List   Diagnosis    Hyperlipidemia    Monocular esotropia of right eye    Neck pain    Cervical disc disorder    Cervical radiculopathy    Cervical spondylosis    Posttraumatic stress disorder    Major depressive disorder, single episode, moderate (HCC)       Current Outpatient Medications   Medication Sig Dispense Refill    metoprolol succinate (TOPROL XL) 100 MG extended release tablet Take 1 tablet by mouth 2 times daily 180 tablet 3    amitriptyline (ELAVIL) 50 MG tablet TAKE 1 TABLET BY MOUTH EVERY NIGHT 30 tablet 2    aspirin 81 MG chewable tablet CHEW AND SWALLOW 1 TABLET BY MOUTH EVERY DAY 90 tablet 3    loratadine (CLARITIN) 10 MG tablet TAKE 1 TABLET BY MOUTH EVERY DAY      buPROPion (WELLBUTRIN XL) 150 MG extended release tablet Take by mouth      buPROPion (WELLBUTRIN XL) 300 MG extended release tablet Take by mouth      terazosin (HYTRIN) 10 MG capsule Take 1 capsule by mouth      atorvastatin (LIPITOR) 40 MG tablet Take 1 tablet by mouth daily 30 tablet 11    prazosin (MINIPRESS) 2 MG capsule Take 1 capsule by mouth nightly      omeprazole (PRILOSEC) 20 MG delayed release capsule Take 1 capsule by mouth  0    Lutein 6 MG CAPS Take 1 tablet by mouth daily Ld 1/21/2019      Multiple Vitamins-Minerals (THERAPEUTIC MULTIVITAMIN-MINERALS) tablet Take 1 tablet by mouth daily Ld 1/21/2019       No current facility-administered medications for this visit.       CC:    Patient is seen in follow-up for:  1. Cardiomyopathy, hypertrophic obstructive (HCC) -severe septal asymmetric left ventricular hypertrophy    2. Hypercholesterolemia    3. Dyspnea on exertion         HPI:  Patient is doing well without any specific cardiac problems.  Patient denies any unusual shortness of breath, chest pain, lightheadedness or dizziness.  Patient is tolerating medications well without side effects.    Walks frequently without any problems as long as he does not overdo it.  Tolerating medications well

## 2024-04-04 ENCOUNTER — TELEMEDICINE (OUTPATIENT)
Dept: PSYCHOLOGY | Age: 49
End: 2024-04-04

## 2024-04-04 DIAGNOSIS — F32.1 MAJOR DEPRESSIVE DISORDER, SINGLE EPISODE, MODERATE (HCC): ICD-10-CM

## 2024-04-04 DIAGNOSIS — F43.10 POSTTRAUMATIC STRESS DISORDER: Primary | ICD-10-CM

## 2024-04-05 ENCOUNTER — HOSPITAL ENCOUNTER (OUTPATIENT)
Dept: CARDIOLOGY | Age: 49
End: 2024-04-05
Attending: INTERNAL MEDICINE
Payer: MEDICARE

## 2024-04-05 VITALS
HEIGHT: 72 IN | SYSTOLIC BLOOD PRESSURE: 118 MMHG | WEIGHT: 222 LBS | DIASTOLIC BLOOD PRESSURE: 74 MMHG | BODY MASS INDEX: 30.07 KG/M2

## 2024-04-05 DIAGNOSIS — I42.1 CARDIOMYOPATHY, HYPERTROPHIC OBSTRUCTIVE (HCC): ICD-10-CM

## 2024-04-05 LAB
ECHO AO ASC DIAM: 3 CM
ECHO AO ASCENDING AORTA INDEX: 1.35 CM/M2
ECHO AV AREA PEAK VELOCITY: 0.8 CM2
ECHO AV AREA VTI: 0.8 CM2
ECHO AV AREA/BSA PEAK VELOCITY: 0.4 CM2/M2
ECHO AV AREA/BSA VTI: 0.4 CM2/M2
ECHO AV CUSP MM: 2.4 CM
ECHO AV MEAN GRADIENT: 52 MMHG
ECHO AV MEAN VELOCITY: 3.4 M/S
ECHO AV PEAK GRADIENT: 105 MMHG
ECHO AV PEAK VELOCITY: 5.1 M/S
ECHO AV VELOCITY RATIO: 0.22
ECHO AV VTI: 106.9 CM
ECHO BSA: 2.26 M2
ECHO EST RA PRESSURE: 3 MMHG
ECHO LA DIAMETER INDEX: 1.7 CM/M2
ECHO LA DIAMETER: 3.8 CM
ECHO LA VOL A-L A2C: 84 ML (ref 18–58)
ECHO LA VOL A-L A4C: 92 ML (ref 18–58)
ECHO LA VOL MOD A2C: 83 ML (ref 18–58)
ECHO LA VOL MOD A4C: 88 ML (ref 18–58)
ECHO LA VOLUME AREA LENGTH: 88 ML
ECHO LA VOLUME INDEX A-L A2C: 38 ML/M2 (ref 16–34)
ECHO LA VOLUME INDEX A-L A4C: 41 ML/M2 (ref 16–34)
ECHO LA VOLUME INDEX AREA LENGTH: 39 ML/M2 (ref 16–34)
ECHO LA VOLUME INDEX MOD A2C: 37 ML/M2 (ref 16–34)
ECHO LA VOLUME INDEX MOD A4C: 39 ML/M2 (ref 16–34)
ECHO LV EF PHYSICIAN: 65 %
ECHO LV FRACTIONAL SHORTENING: 34 % (ref 28–44)
ECHO LV INTERNAL DIMENSION DIASTOLE INDEX: 1.7 CM/M2
ECHO LV INTERNAL DIMENSION DIASTOLIC: 3.8 CM (ref 4.2–5.9)
ECHO LV INTERNAL DIMENSION SYSTOLIC INDEX: 1.12 CM/M2
ECHO LV INTERNAL DIMENSION SYSTOLIC: 2.5 CM
ECHO LV IVSD: 2.1 CM (ref 0.6–1)
ECHO LV IVSS: 2.4 CM
ECHO LV MASS 2D: 292.1 G (ref 88–224)
ECHO LV MASS INDEX 2D: 131 G/M2 (ref 49–115)
ECHO LV POSTERIOR WALL DIASTOLIC: 1.5 CM (ref 0.6–1)
ECHO LV POSTERIOR WALL SYSTOLIC: 1.8 CM
ECHO LV RELATIVE WALL THICKNESS RATIO: 0.79
ECHO LVOT AREA: 3.8 CM2
ECHO LVOT AV VTI INDEX: 0.2
ECHO LVOT DIAM: 2.2 CM
ECHO LVOT MEAN GRADIENT: 2 MMHG
ECHO LVOT PEAK GRADIENT: 5 MMHG
ECHO LVOT PEAK VELOCITY: 1.1 M/S
ECHO LVOT STROKE VOLUME INDEX: 36.8 ML/M2
ECHO LVOT SV: 82.1 ML
ECHO LVOT VTI: 21.6 CM
ECHO MV "A" WAVE DURATION: 128 MSEC
ECHO MV A VELOCITY: 1.02 M/S
ECHO MV AREA PHT: 3.1 CM2
ECHO MV AREA VTI: 3.7 CM2
ECHO MV E DECELERATION TIME (DT): 155 MS
ECHO MV E VELOCITY: 0.65 M/S
ECHO MV E/A RATIO: 0.64
ECHO MV LVOT VTI INDEX: 1.02
ECHO MV MAX VELOCITY: 1.2 M/S
ECHO MV MEAN GRADIENT: 2 MMHG
ECHO MV MEAN VELOCITY: 0.7 M/S
ECHO MV PEAK GRADIENT: 6 MMHG
ECHO MV PRESSURE HALF TIME (PHT): 70.5 MS
ECHO MV VTI: 22 CM
ECHO PV MAX VELOCITY: 1.1 M/S
ECHO PV MEAN GRADIENT: 3 MMHG
ECHO PV MEAN VELOCITY: 0.8 M/S
ECHO PV PEAK GRADIENT: 5 MMHG
ECHO PV VTI: 20.3 CM
ECHO PVEIN A DURATION: 110.7 MS
ECHO PVEIN A VELOCITY: 0.3 M/S
ECHO PVEIN PEAK D VELOCITY: 0.3 M/S
ECHO PVEIN PEAK S VELOCITY: 0.5 M/S
ECHO PVEIN S/D RATIO: 1.7
ECHO RIGHT VENTRICULAR SYSTOLIC PRESSURE (RVSP): 23 MMHG
ECHO RV INTERNAL DIMENSION: 3.5 CM
ECHO TV REGURGITANT MAX VELOCITY: 2.25 M/S
ECHO TV REGURGITANT PEAK GRADIENT: 20 MMHG

## 2024-04-05 PROCEDURE — 93306 TTE W/DOPPLER COMPLETE: CPT | Performed by: INTERNAL MEDICINE

## 2024-04-05 PROCEDURE — 93306 TTE W/DOPPLER COMPLETE: CPT

## 2024-04-05 NOTE — PROGRESS NOTES
WORKER'S COMPENSATION  Behavioral Health Follow-Up  Manuel Villavicencio [facility]    Time Start: 11:30 am   Time End:  12:00 pm  Date of Service:  2024     THIS IS A WORKER'S COMPENSATION VISIT. PLEASE BILL TO ELIZABETH.   CLAIM # [id number]. DOI = 3/24/19. C9 AUTHORIZATION  (12 THERAPY SESSIONS 23 to 3/28/24)      Jorge Alvarado, was evaluated through a synchronous (real-time) audio-video encounter. The patient (and/or guardian if applicable) is aware that this is a billable service, which includes applicable co-pays. This virtual visit was conducted with patient's (and/or legal guardian's) consent. Patient identification was verified, and a caregiver was present when appropriate.  The patient was located at Home:    [address] [place] [address]       The provider was located at Facility (Login Dept): [facility]   [facility] [id number] [phone]     Total time spent on this encounter: 30 minutes    Subjective:   His brother Heber is ill with multiple co-morbidities secondary to prolonged substance abuse. Has had a long hospitalization, will require dialysis.  His brother Zak's long-term gf Lilia recently  of cancer (age 54).     Objective:    Mental Status:    Appearance: casually dressed   Affect:  Restricted   Attitude: Cooperative   Mood:   Dysphoric   Thought Process:  Linear and goal directed   Delusions: no evidence of delusions   Perceptions: No perceptual disturbance   Behavior:   Cooperative   Psychomotor: WNL   Speech:   Soft   Eye Contact: good   Orientation:  oriented to person, place, time, and general circumstances   Judgment & Insight:  normal insight and judgment         Risk Assessment:  Current Suicide & Homicide Risk:  Focused assessment deferred, prior evaluation yielded minimal suicidal/homicidal risk and there are no new circumstances to warrant reassessment.    Protective Factors: Support from family;

## 2024-04-08 ENCOUNTER — TELEPHONE (OUTPATIENT)
Dept: CARDIOLOGY CLINIC | Age: 49
End: 2024-04-08

## 2024-04-08 ENCOUNTER — TELEPHONE (OUTPATIENT)
Dept: FAMILY MEDICINE CLINIC | Age: 49
End: 2024-04-08

## 2024-04-08 NOTE — TELEPHONE ENCOUNTER
----- Message from Adonay Lee MD sent at 4/8/2024 11:18 AM EDT -----  Please let patient know:  echo and monitor about the same. Same medications with follow up in one year.

## 2024-04-09 DIAGNOSIS — I42.1 CARDIOMYOPATHY, HYPERTROPHIC OBSTRUCTIVE (HCC): ICD-10-CM

## 2024-04-18 ENCOUNTER — TELEMEDICINE (OUTPATIENT)
Dept: PSYCHOLOGY | Age: 49
End: 2024-04-18

## 2024-04-18 DIAGNOSIS — F43.10 POSTTRAUMATIC STRESS DISORDER: Primary | ICD-10-CM

## 2024-04-18 DIAGNOSIS — F32.1 MAJOR DEPRESSIVE DISORDER, SINGLE EPISODE, MODERATE (HCC): ICD-10-CM

## 2024-04-19 ENCOUNTER — TELEPHONE (OUTPATIENT)
Dept: FAMILY MEDICINE CLINIC | Age: 49
End: 2024-04-19

## 2024-04-19 NOTE — PROGRESS NOTES
WORKER'S COMPENSATION  Behavioral Health Follow-Up  Manuel Villavicencio [facility]    Time Start: 11:50 am   Time End:  12:10 pm  Date of Service:  4/18/2024     THIS IS A WORKER'S COMPENSATION VISIT. PLEASE BILL TO ELIZABETH.   CLAIM # [id number]. DOI = 3/24/19. C9 AUTHORIZATION  (12 THERAPY SESSIONS 9/28/23 to 3/28/24 - extended to 4/30/24)      Jorge Alvarado was evaluated through a synchronous (real-time) audio-video encounter. The patient (and/or guardian if applicable) is aware that this is a billable service, which includes applicable co-pays. This virtual visit was conducted with patient's (and/or legal guardian's) consent. Patient identification was verified, and a caregiver was present when appropriate.  The patient was located at Home:    [address] [place] [address]       The provider was located at Facility (Login Dept): [facility]  [address] [facility] [id number] [phone]     Total time spent on this encounter: 20 minutes    Subjective:  Making improvements in terms of better able to leave the house.  Brother's medical status has fortunately improved.     Objective:    Mental Status:    Appearance: casually dressed   Affect:  Mood congruent   Attitude: Cooperative   Mood:   Hopeful   Thought Process:  Linear and goal directed   Delusions: no evidence of delusions   Perceptions: No perceptual disturbance   Behavior:   Cooperative   Psychomotor: WNL   Speech:   Soft   Eye Contact: good   Orientation:  oriented to person, place, time, and general circumstances   Judgment & Insight:  normal insight and judgment         Risk Assessment:  Current Suicide & Homicide Risk:  Focused assessment deferred, prior evaluation yielded minimal suicidal/homicidal risk and there are no new circumstances to warrant reassessment.    Protective Factors: Support from family; Christianity convictions      Diagnosis:    ICD-10-CM    1. Posttraumatic stress disorder

## 2024-04-19 NOTE — TELEPHONE ENCOUNTER
Received call from Millie at [employer], asking for RTW plan and date.  Employer was [employer].  Confirmed that returning to work at [employer] is not a goal for Mr. Alvarado.

## 2024-05-06 ENCOUNTER — TELEMEDICINE (OUTPATIENT)
Dept: PSYCHOLOGY | Age: 49
End: 2024-05-06
Payer: COMMERCIAL

## 2024-05-06 DIAGNOSIS — F32.1 MAJOR DEPRESSIVE DISORDER, SINGLE EPISODE, MODERATE (HCC): ICD-10-CM

## 2024-05-06 DIAGNOSIS — F43.10 POSTTRAUMATIC STRESS DISORDER: Primary | ICD-10-CM

## 2024-05-06 PROCEDURE — 90834 PSYTX W PT 45 MINUTES: CPT | Performed by: PSYCHOLOGIST

## 2024-05-06 NOTE — PATIENT INSTRUCTIONS
Plan for excursions or special outings within a few days of chiropractic adjustment.      [facility] Make it a surprise for [facility].  Arrive right at the start.  Sit in last row or stand at the back.  Look at pictures ahead of time and decide where you would ideally sit.      3. Get grill out, get ready to be used.     - Make a menu   - Start with grilling for yourself and Irina   - Then set date to invite a few family members over    4. Keep track of these goals:   - Dates you tried and/or accomplished   - How did you feel doing each activity    Ways to manage anxiety:  Remind yourself that panic is a false alarm.    Being in public is difficult because you're out of practice.  With time it gets easier.  Remember to take slow controlled breaths.  Use optimistic self-coaching:  - I can handle this.  - It may be uncomfortable, but I can get through it  - I have God on my side    5.  Use Irina as a support when ever possible, until you are comfortable on your own.

## 2024-05-06 NOTE — PROGRESS NOTES
WORKER'S COMPENSATION  Behavioral Health Follow-Up  Manuel Villavicencio [facility]    Time Start: 11:40 am   Time End:  12:20 pm  Date of Service:  5/6/2024     THIS IS A WORKER'S COMPENSATION VISIT. PLEASE BILL TO ELIZABETH.   CLAIM # [id number]. DOI = 3/24/19. C9 AUTHORIZATION  (6 THERAPY SESSIONS 5/6/24 to 11/6/24).  THIS IS THE 1ST OF 6 APPROVED VISITS.      Jorge Alvarado, was evaluated through a synchronous (real-time) audio-video encounter. The patient (and/or guardian if applicable) is aware that this is a billable service, which includes applicable co-pays. This virtual visit was conducted with patient's (and/or legal guardian's) consent. Patient identification was verified, and a caregiver was present when appropriate.  The patient was located at Home:    [address] [place] [address]       The provider was located at Facility (Login Dept): [facility]  2031 [facility] [address] [phone]     Total time spent on this encounter:  40 minutes    Subjective:  Making improvements in terms of better able to leave the house (see below).  Scheduled for corneal transplant eval in Aug 2024 at [facility] for his right eye keratoconus.    Objective:    Mental Status:    Appearance: casually dressed   Affect:  Mood congruent   Attitude: Cooperative   Mood:   Hopeful   Thought Process:  Linear and goal directed   Delusions: no evidence of delusions   Perceptions: No perceptual disturbance   Behavior:   Cooperative   Psychomotor: WNL   Speech:   Soft   Eye Contact: good   Orientation:  oriented to person, place, time, and general circumstances   Judgment & Insight:  normal insight and judgment         Risk Assessment:  Current Suicide & Homicide Risk:  Focused assessment deferred, prior evaluation yielded minimal suicidal/homicidal risk and there are no new circumstances to warrant reassessment.    Protective Factors: Support from family; Taoism

## 2024-05-20 DIAGNOSIS — F07.81 POST CONCUSSION SYNDROME: ICD-10-CM

## 2024-05-20 RX ORDER — MECLIZINE HYDROCHLORIDE 25 MG/1
25 TABLET ORAL 3 TIMES DAILY PRN
Qty: 90 TABLET | Refills: 0 | Status: SHIPPED | OUTPATIENT
Start: 2024-05-20 | End: 2024-06-19

## 2024-06-10 ENCOUNTER — TELEMEDICINE (OUTPATIENT)
Dept: PSYCHOLOGY | Age: 49
End: 2024-06-10
Payer: COMMERCIAL

## 2024-06-10 DIAGNOSIS — F32.1 MAJOR DEPRESSIVE DISORDER, SINGLE EPISODE, MODERATE (HCC): ICD-10-CM

## 2024-06-10 DIAGNOSIS — F43.10 POSTTRAUMATIC STRESS DISORDER: Primary | ICD-10-CM

## 2024-06-10 PROCEDURE — 90834 PSYTX W PT 45 MINUTES: CPT | Performed by: PSYCHOLOGIST

## 2024-06-10 NOTE — PROGRESS NOTES
WORKER'S COMPENSATION  Behavioral Health Follow-Up  Manuel Saud [facility]    Time Start: 10:20 am   Time End:  11:00 am  Date of Service:  6/10/2024     THIS IS A WORKER'S COMPENSATION VISIT. PLEASE BILL TO ELIZABETH.   CLAIM # [id number]. DOI = 3/24/19. C9 AUTHORIZATION  (6 THERAPY SESSIONS 5/6/24 to 11/6/24).  THIS IS THE 2nd OF 6 APPROVED VISITS.      Jorge Alvarado, was evaluated through a synchronous (real-time) audio-video encounter. Conducting video visits due to patient's difficulty driving secondary to his chronic pain and post-concussive symptoms. The patient (and/or guardian if applicable) is aware that this is a billable service, which includes applicable co-pays. This virtual visit was conducted with patient's (and/or legal guardian's) consent. Patient identification was verified, and a caregiver was present when appropriate.  The patient was located at Home:    [address] [place] [address]       The provider was located at Facility (Login Dept): [facility]  [address] [facility] [id number] [phone]     Total time spent on this encounter:  40 minutes    Subjective:  Distress due to brother Heebr's medical status (currently in LTAC due to kidney failure). Made very good progress on goals set last visit, see below. Scheduled for corneal transplant eval in Aug 2024 at [facility] for his right eye keratoconus.    Objective:    Mental Status:    Appearance: casually dressed   Affect:  Mood congruent   Attitude: Cooperative   Mood:   Hopeful   Thought Process:  Linear and goal directed   Delusions: no evidence of delusions   Perceptions: No perceptual disturbance   Behavior:   Cooperative   Psychomotor: WNL   Speech:   Soft   Eye Contact: good   Orientation:  oriented to person, place, time, and general circumstances   Judgment & Insight:  normal insight and judgment         Risk Assessment:  Current Suicide & Homicide Risk:  Focused

## 2024-06-23 DIAGNOSIS — F07.81 POST CONCUSSION SYNDROME: ICD-10-CM

## 2024-06-24 RX ORDER — MECLIZINE HYDROCHLORIDE 25 MG/1
25 TABLET ORAL 3 TIMES DAILY PRN
Qty: 90 TABLET | Refills: 0 | Status: SHIPPED | OUTPATIENT
Start: 2024-06-24 | End: 2024-07-24

## 2024-07-09 ENCOUNTER — TELEMEDICINE (OUTPATIENT)
Dept: PSYCHOLOGY | Age: 49
End: 2024-07-09

## 2024-07-09 DIAGNOSIS — F32.1 MAJOR DEPRESSIVE DISORDER, SINGLE EPISODE, MODERATE (HCC): ICD-10-CM

## 2024-07-09 DIAGNOSIS — F43.10 POSTTRAUMATIC STRESS DISORDER: Primary | ICD-10-CM

## 2024-07-12 NOTE — PROGRESS NOTES
WORKER'S COMPENSATION  Behavioral Health Follow-Up  Manuel Saud [facility]    Time Start: 11:30 am   Time End:  12:00 pm  Date of Service:  7/9/2024     THIS IS A WORKER'S COMPENSATION VISIT. PLEASE BILL TO ELIZABETH.   CLAIM # [id number]. DOI = 3/24/19. C9 AUTHORIZATION  (6 THERAPY SESSIONS 5/6/24 to 11/6/24).  THIS IS THE 3rd OF 6 APPROVED VISITS.      Jorge Alvarado, was evaluated through a synchronous (real-time) audio-video encounter. Conducting video visits due to patient's difficulty driving secondary to his chronic pain and post-concussive symptoms. The patient (and/or guardian if applicable) is aware that this is a billable service, which includes applicable co-pays. This virtual visit was conducted with patient's (and/or legal guardian's) consent. Patient identification was verified, and a caregiver was present when appropriate.  The patient was located at Home:    [address] [place] [address]       The provider was located at Facility (Login Dept): [facility]  [address] [facility] [id number] [phone]     Total time spent on this encounter: 30 minutes    Subjective:  Brother Heber passed away on June 23, which has caused a significant amount of distress.    Objective:    Mental Status:    Appearance: casually dressed   Affect:  Mood congruent   Attitude: Cooperative   Mood:   Depressed   Thought Process:  Linear and goal directed   Delusions: no evidence of delusions   Perceptions: No perceptual disturbance   Behavior:   Cooperative   Psychomotor: WNL   Speech:   Soft   Eye Contact: good   Orientation:  oriented to person, place, time, and general circumstances   Judgment & Insight:  normal insight and judgment         Risk Assessment:  Current Suicide & Homicide Risk:  Denies SI/HI  Protective Factors: Support from family; Church convictions      Diagnosis:    ICD-10-CM    1. Posttraumatic stress disorder  F43.10       2. Major

## 2024-08-05 ENCOUNTER — TELEMEDICINE (OUTPATIENT)
Dept: PSYCHOLOGY | Age: 49
End: 2024-08-05
Payer: COMMERCIAL

## 2024-08-05 DIAGNOSIS — F43.10 POSTTRAUMATIC STRESS DISORDER: Primary | ICD-10-CM

## 2024-08-05 DIAGNOSIS — F32.1 MAJOR DEPRESSIVE DISORDER, SINGLE EPISODE, MODERATE (HCC): ICD-10-CM

## 2024-08-05 PROCEDURE — 90834 PSYTX W PT 45 MINUTES: CPT | Performed by: PSYCHOLOGIST

## 2024-08-06 NOTE — PROGRESS NOTES
WORKER'S COMPENSATION  Behavioral Health Follow-Up  Manuel Villavicencio [facility]    Time Start: 11:30 am   Time End:  12:15 pm  Date of Service:  8/5/2024     THIS IS A WORKER'S COMPENSATION VISIT. PLEASE BILL TO ELIZABETH.   CLAIM # [id number]. DOI = 3/24/19. C9 AUTHORIZATION  (6 THERAPY SESSIONS 5/6/24 to 11/6/24).  THIS IS THE 4th OF 6 APPROVED VISITS.      Jorge Alvarado, was evaluated through a synchronous (real-time) audio-video encounter. Conducting video visits due to patient's difficulty driving secondary to his chronic pain and post-concussive symptoms. The patient (and/or guardian if applicable) is aware that this is a billable service, which includes applicable co-pays. This virtual visit was conducted with patient's (and/or legal guardian's) consent. Patient identification was verified, and a caregiver was present when appropriate. Telehealth services provided due to patient's inability to drive secondary to his work-related injury.  The patient was located at Home:    [address] [place] [address]       The provider was located at Facility (Login Dept): [facility]  [address] [facility] [id number] [phone]     Total time spent on this encounter: 45 minutes    Subjective:  Brother Heber passed away on June 23, progressing through grief processing and exhibiting improvements.     Objective:    Mental Status:    Appearance: casually dressed   Affect:  Mood congruent   Attitude: Cooperative   Mood:   Depressed, improving   Thought Process:  Linear and goal directed   Delusions: no evidence of delusions   Perceptions: No perceptual disturbance   Behavior:   Cooperative   Psychomotor: WNL   Speech:   Soft   Eye Contact: good   Orientation:  oriented to person, place, time, and general circumstances   Judgment & Insight:  normal insight and judgment         Risk Assessment:  Current Suicide & Homicide Risk:  Denies SI/HI  Protective Factors:

## 2024-08-20 DIAGNOSIS — F07.81 POST CONCUSSION SYNDROME: ICD-10-CM

## 2024-08-21 RX ORDER — MECLIZINE HYDROCHLORIDE 25 MG/1
25 TABLET ORAL 3 TIMES DAILY PRN
Qty: 90 TABLET | Refills: 0 | Status: SHIPPED | OUTPATIENT
Start: 2024-08-21 | End: 2024-09-20

## 2024-09-09 ENCOUNTER — TELEMEDICINE (OUTPATIENT)
Dept: PSYCHOLOGY | Age: 49
End: 2024-09-09
Payer: COMMERCIAL

## 2024-09-09 DIAGNOSIS — F43.10 POSTTRAUMATIC STRESS DISORDER: Primary | ICD-10-CM

## 2024-09-09 DIAGNOSIS — F32.1 MAJOR DEPRESSIVE DISORDER, SINGLE EPISODE, MODERATE (HCC): ICD-10-CM

## 2024-09-09 PROCEDURE — 90832 PSYTX W PT 30 MINUTES: CPT | Performed by: PSYCHOLOGIST

## 2024-10-14 ENCOUNTER — TELEMEDICINE (OUTPATIENT)
Dept: PSYCHOLOGY | Age: 49
End: 2024-10-14

## 2024-10-14 DIAGNOSIS — F43.10 POSTTRAUMATIC STRESS DISORDER: Primary | ICD-10-CM

## 2024-10-14 DIAGNOSIS — F32.1 MAJOR DEPRESSIVE DISORDER, SINGLE EPISODE, MODERATE (HCC): ICD-10-CM

## 2024-10-14 NOTE — PROGRESS NOTES
WORKER'S COMPENSATION  Behavioral Health Follow-Up  Manuel Saud [facility]    Time Start: 11:35 am   Time End:  12:05 pm  Date of Service:  10/14/2024     THIS IS A WORKER'S COMPENSATION VISIT. PLEASE BILL TO ELIZABETH.   CLAIM # [id number]. DOI = 3/24/19. C9 AUTHORIZATION  (6 THERAPY SESSIONS 5/6/24 to 11/6/24).  THIS IS THE 6th OF 6 APPROVED VISITS.      Jorge Alvarado, was evaluated through a synchronous (real-time) audio-video encounter. Conducting video visits due to patient's difficulty driving secondary to his chronic pain and post-concussive symptoms. The patient (and/or guardian if applicable) is aware that this is a billable service, which includes applicable co-pays. This virtual visit was conducted with patient's (and/or legal guardian's) consent. Patient identification was verified, and a caregiver was present when appropriate. Telehealth services provided due to patient's inability to drive secondary to his work-related injury.  The patient was located at Home:    [address] [place] [address]       The provider was located at Facility (Login Dept): [facility]  [address] [facility] [id number] [phone]     Total time spent on this encounter: 30 minutes    Symptoms reported: Headache pain, anxiety (improving), hypervigilence    Impact on functioning: Interpersonal    Mental Status:    Appearance: casually dressed   Affect:  Mood congruent   Attitude: Cooperative   Mood:   Depressed, improving   Thought Process:  Linear and goal directed   Delusions: no evidence of delusions   Perceptions: No perceptual disturbance   Behavior:   Cooperative   Psychomotor: WNL   Speech:   Soft   Eye Contact: good   Orientation:  oriented to person, place, time, and general circumstances   Judgment & Insight:  normal insight and judgment     PHQ-9 = 15  CHIVO-7 = 16  PTSD checklist = 42 (reduced from 45 at last assessment in April 2024)    Risk

## 2024-11-11 ENCOUNTER — TELEMEDICINE (OUTPATIENT)
Dept: PSYCHOLOGY | Age: 49
End: 2024-11-11
Payer: COMMERCIAL

## 2024-11-11 DIAGNOSIS — F32.1 MAJOR DEPRESSIVE DISORDER, SINGLE EPISODE, MODERATE (HCC): ICD-10-CM

## 2024-11-11 DIAGNOSIS — F43.10 POSTTRAUMATIC STRESS DISORDER: Primary | ICD-10-CM

## 2024-11-11 PROCEDURE — 90832 PSYTX W PT 30 MINUTES: CPT | Performed by: PSYCHOLOGIST

## 2024-11-11 NOTE — PROGRESS NOTES
WORKER'S COMPENSATION  Behavioral Health Follow-Up  Manuel Villavicencio [facility]    Time Start: 11:30 am   Time End:  12:00 pm  Date of Service:  11/11/2024     THIS IS A WORKER'S COMPENSATION VISIT. PLEASE BILL TO ELIZABETH.   CLAIM # [id number]. DOI = 3/24/19. C9 AUTHORIZATION  (6 THERAPY SESSIONS 11/11/24 to 5/11/25).  THIS IS THE 1st OF 6 APPROVED VISITS.      Jorge Alvarado, was evaluated through a synchronous (real-time) audio-video encounter. Conducting video visits due to patient's difficulty driving secondary to his chronic pain and post-concussive symptoms. The patient (and/or guardian if applicable) is aware that this is a billable service, which includes applicable co-pays. This virtual visit was conducted with patient's (and/or legal guardian's) consent. Patient identification was verified, and a caregiver was present when appropriate. Telehealth services provided due to patient's inability to drive secondary to his work-related injury.  The patient was located at Home:    [address] [place] [address]       The provider was located at Facility (Login Dept): [facility]  [address] [facility] [id number] [phone]     Total time spent on this encounter: 30 minutes    Symptoms reported: Insomnia, anxiety, chronic pain    Impact on functioning: Socialization outside the home    Mental Status:    Appearance: casually dressed   Affect:  Mood congruent   Attitude: Cooperative   Mood:   Depressed, improving   Thought Process:  Linear and goal directed   Delusions: no evidence of delusions   Perceptions: No perceptual disturbance   Behavior:   Cooperative   Psychomotor: WNL   Speech:   Soft   Eye Contact: good   Orientation:  oriented to person, place, time, and general circumstances   Judgment & Insight:  normal insight and judgment       Risk Assessment:  Current Suicide & Homicide Risk:  Focused assessment deferred, prior evaluation yielded

## 2024-11-13 DIAGNOSIS — F07.81 POST CONCUSSION SYNDROME: ICD-10-CM

## 2024-11-14 RX ORDER — MECLIZINE HYDROCHLORIDE 25 MG/1
25 TABLET ORAL 3 TIMES DAILY PRN
Qty: 90 TABLET | Refills: 0 | Status: SHIPPED | OUTPATIENT
Start: 2024-11-14 | End: 2024-12-14

## 2024-12-03 RX ORDER — METOPROLOL SUCCINATE 100 MG/1
100 TABLET, EXTENDED RELEASE ORAL 2 TIMES DAILY
Qty: 180 TABLET | Refills: 3 | Status: SHIPPED | OUTPATIENT
Start: 2024-12-03

## 2024-12-12 ENCOUNTER — TELEMEDICINE (OUTPATIENT)
Dept: PSYCHOLOGY | Age: 49
End: 2024-12-12

## 2024-12-12 DIAGNOSIS — F43.10 POSTTRAUMATIC STRESS DISORDER: Primary | ICD-10-CM

## 2024-12-12 DIAGNOSIS — F32.1 MAJOR DEPRESSIVE DISORDER, SINGLE EPISODE, MODERATE (HCC): ICD-10-CM

## 2024-12-13 NOTE — PROGRESS NOTES
WORKER'S COMPENSATION  Behavioral Health Follow-Up  Manuel Villavicencio [facility]    Time Start: 11:00 am   Time End:  11: 20 am  Date of Service:  12/12/2024     THIS IS A WORKER'S COMPENSATION VISIT. PLEASE BILL TO ELIZABETH.   CLAIM # [id number]. DOI = 3/24/19. C9 AUTHORIZATION  (6 THERAPY SESSIONS 11/11/24 to 5/11/25).  THIS IS THE 2nd OF 6 APPROVED VISITS.      Jorge Alvarado, was evaluated through a synchronous (real-time) audio-video encounter. Conducting video visits due to patient's difficulty driving secondary to his chronic pain and post-concussive symptoms. The patient (and/or guardian if applicable) is aware that this is a billable service, which includes applicable co-pays. This virtual visit was conducted with patient's (and/or legal guardian's) consent. Patient identification was verified, and a caregiver was present when appropriate. Telehealth services provided due to patient's inability to drive secondary to his work-related injury.  The patient was located at Home:    [address] [place] [id number]       The provider was located at Facility (Login Dept): [facility]IMARY [facility]ARE  [address] [facility] [id number] [phone]     Total time spent on this encounter: 20 minutes    Symptoms reported: Insomnia, anxiety, chronic pain    Impact on functioning: Socialization outside the home    Mental Status:    Appearance: casually dressed   Affect:  Mood congruent   Attitude: Cooperative   Mood:   Dysphoric   Thought Process:  Linear and goal directed   Delusions: no evidence of delusions   Perceptions: No perceptual disturbance   Behavior:   Cooperative   Psychomotor: WNL   Speech:   Soft   Eye Contact: good   Orientation:  oriented to person, place, time, and general circumstances   Judgment & Insight:  normal insight and judgment       Risk Assessment:  Current Suicide & Homicide Risk:  Focused assessment deferred, prior evaluation yielded minimal

## 2025-01-16 ENCOUNTER — TELEMEDICINE (OUTPATIENT)
Dept: PSYCHOLOGY | Age: 50
End: 2025-01-16
Payer: COMMERCIAL

## 2025-01-16 DIAGNOSIS — F32.1 MAJOR DEPRESSIVE DISORDER, SINGLE EPISODE, MODERATE (HCC): ICD-10-CM

## 2025-01-16 DIAGNOSIS — F43.10 POSTTRAUMATIC STRESS DISORDER: Primary | ICD-10-CM

## 2025-01-16 PROCEDURE — 90832 PSYTX W PT 30 MINUTES: CPT | Performed by: PSYCHOLOGIST

## 2025-01-16 NOTE — PROGRESS NOTES
WORKER'S COMPENSATION  Behavioral Health Follow-Up  Bon [place]han [facility]    Time Start: 11:00 am   Time End:  11: 25 am  Date of Service:  1/16/2025     THIS IS A WORKER'S COMPENSATION VISIT. PLEASE BILL TO ELIZABETH.   CLAIM # [id number]. DOI = 3/24/19. C9 AUTHORIZATION  (6 THERAPY SESSIONS 11/11/24 to 5/11/25).  THIS IS THE 3rd OF 6 APPROVED VISITS.      Jorge Alvarado, was evaluated through a synchronous (real-time) audio-video encounter. Conducting video visits due to patient's difficulty driving secondary to his chronic pain and post-concussive symptoms. The patient (and/or guardian if applicable) is aware that this is a billable service, which includes applicable co-pays. This virtual visit was conducted with patient's (and/or legal guardian's) consent. Patient identification was verified, and a caregiver was present when appropriate. Telehealth services provided due to patient's inability to drive secondary to his work-related injury.  The patient was located at Home:    [address] [place] [address]       The provider was located at Facility (Login Dept): [facility] PRIMARY [facility]ARE  [address] [facility] [id number] [phone]     Total time spent on this encounter: 25 minutes    Symptoms reported: Stress, cough/cold symptoms, chronic pain    Impact on functioning: Socialization outside the home    Mental Status:    Appearance: casually dressed   Affect:  Mood congruent   Attitude: Cooperative   Mood:   Dysphoric   Thought Process:  Linear and goal directed   Delusions: no evidence of delusions   Perceptions: No perceptual disturbance   Behavior:   Cooperative   Psychomotor: WNL   Speech:   Soft   Eye Contact: good   Orientation:  oriented to person, place, time, and general circumstances   Judgment & Insight:  normal insight and judgment       Risk Assessment:  Current Suicide & Homicide Risk:  Focused assessment deferred, prior evaluation yielded

## 2025-02-12 DIAGNOSIS — F07.81 POST CONCUSSION SYNDROME: ICD-10-CM

## 2025-02-16 RX ORDER — MECLIZINE HYDROCHLORIDE 25 MG/1
25 TABLET ORAL 3 TIMES DAILY PRN
Qty: 90 TABLET | Refills: 0 | Status: SHIPPED | OUTPATIENT
Start: 2025-02-16 | End: 2025-03-18

## 2025-02-17 ENCOUNTER — TELEMEDICINE (OUTPATIENT)
Dept: PSYCHOLOGY | Age: 50
End: 2025-02-17
Payer: COMMERCIAL

## 2025-02-17 DIAGNOSIS — F32.1 MAJOR DEPRESSIVE DISORDER, SINGLE EPISODE, MODERATE (HCC): ICD-10-CM

## 2025-02-17 DIAGNOSIS — F43.10 POSTTRAUMATIC STRESS DISORDER: Primary | ICD-10-CM

## 2025-02-17 PROCEDURE — 90832 PSYTX W PT 30 MINUTES: CPT | Performed by: PSYCHOLOGIST

## 2025-02-17 NOTE — PROGRESS NOTES
WORKER'S COMPENSATION  Behavioral Health Follow-Up  Manuel Villavicencio [facility]    Time Start: 11:10 am   Time End:  11: 35 am  Date of Service:  2/17/2025     THIS IS A WORKER'S COMPENSATION VISIT. PLEASE BILL TO ELIZABETH.   CLAIM # [id number]. DOI = 3/24/19. C9 AUTHORIZATION  (6 THERAPY SESSIONS 11/11/24 to 5/11/25).  THIS IS THE 4th OF 6 APPROVED VISITS.      Jorge Alvarado, was evaluated through a synchronous (real-time) audio-video encounter. Conducting video visits due to patient's difficulty driving secondary to his chronic pain and post-concussive symptoms. The patient (and/or guardian if applicable) is aware that this is a billable service, which includes applicable co-pays. This virtual visit was conducted with patient's (and/or legal guardian's) consent. Patient identification was verified, and a caregiver was present when appropriate. Telehealth services provided due to patient's inability to drive secondary to his work-related injury.  The patient was located at Home:    [address] [place] [address]       The provider was located at Facility (Login Dept): [facility] PRIMARY [facility]ARE  [address] [facility] [id number] [phone]     Total time spent on this encounter: 25 minutes    Symptoms reported: Stress (improving), chronic pain    Impact on functioning: Socialization outside the home    Mental Status:    Appearance: casually dressed   Affect:  Mood congruent   Attitude: Cooperative   Mood:   Hopeful   Thought Process:  Linear and goal directed   Delusions: no evidence of delusions   Perceptions: No perceptual disturbance   Behavior:   Cooperative   Psychomotor: WNL   Speech:   Soft   Eye Contact: good   Orientation:  oriented to person, place, time, and general circumstances   Judgment & Insight:  normal insight and judgment       Risk Assessment:  Current Suicide & Homicide Risk:  Focused assessment deferred, prior evaluation yielded minimal

## 2025-03-03 ENCOUNTER — HOSPITAL ENCOUNTER (OUTPATIENT)
Age: 50
Discharge: HOME | End: 2025-03-03
Payer: MEDICARE

## 2025-03-03 PROCEDURE — 86003 ALLG SPEC IGE CRUDE XTRC EA: CPT

## 2025-03-03 PROCEDURE — 36415 COLL VENOUS BLD VENIPUNCTURE: CPT

## 2025-03-03 PROCEDURE — 82785 ASSAY OF IGE: CPT

## 2025-03-04 LAB
A ALTERNATA IGE QN: <0.1 KU/L (ref 0–0.34)
A FUMIGATUS IGE QN: <0.1 KU/L (ref 0–0.34)
ALLERGEN BIRCH IGE: 4.91 KU/L (ref 0–0.34)
BERMUDA GRASS IGE QN: 3.51 KU/L (ref 0–0.34)
BOXELDER IGE QN: 3.72 KU/L (ref 0–0.34)
C HERBARUM IGE QN: <0.1 KUL/L (ref 0–0.34)
CALIF WALNUT POLN IGE QN: 5 KU/L (ref 0–0.34)
CAT DANDER IGE QN: 1.26 KU/L (ref 0–0.34)
CMN PIGWEED IGE QN: 2.46 KU/L (ref 0–0.34)
COMMON RAGWEED IGE QN: 12.8 KU/L (ref 0–0.34)
COTTONWOOD IGE QN: 4.04 KU/L (ref 0–0.34)
D FARINAE IGE QN: <0.1 KU/L (ref 0–0.34)
D PTERONYSS IGE QN: <0.1 KU/L (ref 0–0.34)
DOG DANDER IGE QN: 0.42 KU/L (ref 0–0.34)
IGE SERPL-ACNC: 550 IU/ML (ref 0–100)
LONDON PLANE IGE QN: 2.01 KU/L (ref 0–0.34)
M RACEMOSUS IGE QN: <0.1 KU/L (ref 0–0.34)
MOUSE EPITH IGE QN: <0.1 KU/L (ref 0–0.34)
MT JUNIPER IGE QN: 1.08 KU/L (ref 0–0.34)
P NOTATUM IGE QN: <0.1 KU/L (ref 0–0.34)
PECAN/HICK TREE IGE QN: 3.42 KU/L (ref 0–0.34)
PORK IGE QN: <0.1 KU/L (ref 0–0.34)
ROACH IGE QN: 0.11 KU/L (ref 0–0.34)
SALTWORT IGE QN: 2.47 KU/L (ref 0–0.34)
SHEEP SORREL IGE QN: 3.01 KU/L (ref 0–0.34)
TIMOTHY IGE QN: 13.6 KU/L (ref 0–0.34)
WHITE ASH IGE QN: 3.5 KU/L (ref 0–0.34)
WHITE ELM IGE QN: 3.69 KU/L (ref 0–0.34)
WHITE MULBERRY IGE QN: 0.85 KU/L (ref 0–0.34)
WHITE OAK IGE QN: 2.04 KU/L (ref 0–0.34)

## 2025-03-18 ENCOUNTER — TELEMEDICINE (OUTPATIENT)
Dept: PSYCHOLOGY | Age: 50
End: 2025-03-18
Payer: COMMERCIAL

## 2025-03-18 DIAGNOSIS — F43.10 POSTTRAUMATIC STRESS DISORDER: Primary | ICD-10-CM

## 2025-03-18 DIAGNOSIS — F32.1 MAJOR DEPRESSIVE DISORDER, SINGLE EPISODE, MODERATE (HCC): ICD-10-CM

## 2025-03-18 PROCEDURE — 90832 PSYTX W PT 30 MINUTES: CPT | Performed by: PSYCHOLOGIST

## 2025-03-18 NOTE — PROGRESS NOTES
WORKER'S COMPENSATION  Behavioral Health Follow-Up  Manuel Saud [facility]    Time Start: 3:00 pm   Time End:  3:20 pm  Date of Service:  3/18/2025     THIS IS A WORKER'S COMPENSATION VISIT. PLEASE BILL TO ELIZABETH.   CLAIM # [id number]. DOI = 3/24/19. C9 AUTHORIZATION  (6 THERAPY SESSIONS 11/11/24 to 5/11/25).  THIS IS THE 5th OF 6 APPROVED VISITS.      Jorge Alvarado, was evaluated through a synchronous (real-time) audio-video encounter. Conducting video visits due to patient's difficulty driving secondary to his chronic pain and post-concussive symptoms. The patient (and/or guardian if applicable) is aware that this is a billable service, which includes applicable co-pays. This virtual visit was conducted with patient's (and/or legal guardian's) consent. Patient identification was verified, and a caregiver was present when appropriate. Telehealth services provided due to patient's inability to drive secondary to his work-related injury.  The patient was located at Home:    [address] [place] [address]       The provider was located at Facility (Login Dept): [facility]IMARY [facility]  [address] [facility] [id number] [phone]     Total time spent on this encounter: 20 minutes    Symptoms reported: Stress (improving), chronic pain    Impact on functioning: Socialization outside the home    Mental Status:    Appearance: casually dressed   Affect:  Mood congruent   Attitude: Cooperative   Mood:   Hopeful   Thought Process:  Linear and goal directed   Delusions: no evidence of delusions   Perceptions: No perceptual disturbance   Behavior:   Cooperative   Psychomotor: WNL   Speech:   Soft   Eye Contact: good   Orientation:  oriented to person, place, time, and general circumstances   Judgment & Insight:  normal insight and judgment       Risk Assessment:  Current Suicide & Homicide Risk:  Focused assessment deferred, prior evaluation yielded minimal 
he can progress toward his goals of spending more time outside the house.  He has not been able to do so without her help.  Reviewed anxiety management strategies to be able to gradually decrease his anxiety leaving the house. He is making progress toward his permanent disability with [facility] and was able to work out issues with Social Security.  Despite being approved for Social Security he has not gotten a check in 1 year.  He feels that is close to being resolved and should get his back pay.  Reviewed his goals to spend more time outdoors, exercise with his wife, and continue to pursue  improvements with his Voodoo involvement.     Treatment plan goal(s) addressed:  To reduce the frequency and severity of depressed mood, to improve the quality and quantity of sleep, and promote pain coping techniques.    Homework/recommendations:    Keep track of these goals:   - Dates you tried and/or accomplished   - How did you feel doing each activity      Treatment plan review:       Goals for the next 6 months:  1) Spend increased time outdoors, resume former hobby of BBQ-ing  2) Go for walks in neighborhood park  3) Take advantage of the pain relief he gets from chiropractic adjustments to increase activity in the following days  4) Attend Voodoo of  friend Vikas  5) Resume office visits  6) Have family and friends visit his home  7) Drive if medically cleared      Plan:  Follow up in 4 weeks, 4/17/25    Electronically signed by Marycruz Zambrano, PhD

## 2025-04-17 ENCOUNTER — TELEMEDICINE (OUTPATIENT)
Dept: PSYCHOLOGY | Age: 50
End: 2025-04-17

## 2025-04-17 DIAGNOSIS — F32.1 MAJOR DEPRESSIVE DISORDER, SINGLE EPISODE, MODERATE (HCC): ICD-10-CM

## 2025-04-17 DIAGNOSIS — F43.10 POSTTRAUMATIC STRESS DISORDER: Primary | ICD-10-CM

## 2025-04-17 NOTE — PROGRESS NOTES
WORKER'S COMPENSATION  Behavioral Health Follow-Up  Manuel Saud [facility]    Time Start: 11:00 am   Time End:  11:30 am  Date of Service:  4/17/25     THIS IS A WORKER'S COMPENSATION VISIT. PLEASE BILL TO ELIZABETH.   CLAIM # [id number]. DOI = 3/24/19. C9 AUTHORIZATION  (6 THERAPY SESSIONS 11/11/24 to 5/11/25).  THIS IS THE 6th OF 6 APPROVED VISITS.      Jorge Alvarado, was evaluated through a synchronous (real-time) audio-video encounter. Conducting video visits due to patient's difficulty driving secondary to his chronic pain and post-concussive symptoms. The patient (and/or guardian if applicable) is aware that this is a billable service, which includes applicable co-pays. This virtual visit was conducted with patient's (and/or legal guardian's) consent. Patient identification was verified, and a caregiver was present when appropriate. Telehealth services provided due to patient's inability to drive secondary to his work-related injury.  The patient was located at Home:    [address] [place] [address]       The provider was located at Facility (Login Dept): [facility]IMARY [facility]  [address] [facility] [id number] [phone]     Total time spent on this encounter: 30 minutes    Symptoms reported: Stress (improving), chronic pain    Impact on functioning: Socialization outside the home    Mental Status:    Appearance: casually dressed   Affect:  Mood congruent   Attitude: Cooperative   Mood:   Hopeful   Thought Process:  Linear and goal directed   Delusions: no evidence of delusions   Perceptions: No perceptual disturbance   Behavior:   Cooperative   Psychomotor: WNL   Speech:   Soft   Eye Contact: good   Orientation:  oriented to person, place, time, and general circumstances   Judgment & Insight:  normal insight and judgment       Risk Assessment:  Current Suicide & Homicide Risk:  Focused assessment deferred, prior evaluation yielded minimal

## 2025-05-19 ENCOUNTER — TELEMEDICINE (OUTPATIENT)
Dept: PSYCHOLOGY | Age: 50
End: 2025-05-19
Payer: COMMERCIAL

## 2025-05-19 DIAGNOSIS — F32.1 MAJOR DEPRESSIVE DISORDER, SINGLE EPISODE, MODERATE (HCC): ICD-10-CM

## 2025-05-19 DIAGNOSIS — F43.10 POSTTRAUMATIC STRESS DISORDER: Primary | ICD-10-CM

## 2025-05-19 PROCEDURE — 90832 PSYTX W PT 30 MINUTES: CPT | Performed by: PSYCHOLOGIST

## 2025-05-19 NOTE — PROGRESS NOTES
WORKER'S COMPENSATION  Behavioral Health Follow-Up  Manuel Villavicencio [facility]    Time Start: 11:30 am   Time End:  12:00 pm    Date of Service:  5/19/25    THIS IS A WORKER'S COMPENSATION VISIT. PLEASE BILL TO ELIZABETH.   CLAIM # [id number]. DOI = 3/24/19. C9 AUTHORIZATION  (5/19/25 to 11/19/25).        Jorge Alvarado, was evaluated through a synchronous (real-time) audio-video encounter. Conducting video visits due to patient's difficulty driving secondary to his chronic pain and post-concussive symptoms. The patient (and/or guardian if applicable) is aware that this is a billable service, which includes applicable co-pays. This virtual visit was conducted with patient's (and/or legal guardian's) consent. Patient identification was verified, and a caregiver was present when appropriate. Telehealth services provided due to patient's inability to drive secondary to his work-related injury.  The patient was located at Home:    [address] [place] [address]       The provider was located at Facility (Login Dept): [facility] PRIMARY [facility]ARE  [address] [facility] [id number] [phone]     Total time spent on this encounter: 30 minutes        Symptoms reported: Grief due to recent suicide of long-term friend    Impact on functioning: Socialization outside the home    Mental Status:    Appearance: casually dressed   Affect:  Mood congruent   Attitude: Cooperative   Mood:   Hopeful   Thought Process:  Linear and goal directed   Delusions: no evidence of delusions   Perceptions: No perceptual disturbance   Behavior:   Cooperative   Psychomotor: WNL   Speech:   Soft   Eye Contact: good   Orientation:  oriented to person, place, time, and general circumstances   Judgment & Insight:  normal insight and judgment       Risk Assessment:  Current Suicide & Homicide Risk:  Focused assessment deferred, prior evaluation yielded minimal suicidal/homicidal risk and there are no new

## 2025-05-21 ENCOUNTER — OFFICE VISIT (OUTPATIENT)
Dept: CARDIOLOGY CLINIC | Age: 50
End: 2025-05-21
Payer: MEDICARE

## 2025-05-21 VITALS
HEIGHT: 72 IN | WEIGHT: 214.5 LBS | TEMPERATURE: 97.7 F | RESPIRATION RATE: 18 BRPM | SYSTOLIC BLOOD PRESSURE: 114 MMHG | DIASTOLIC BLOOD PRESSURE: 70 MMHG | BODY MASS INDEX: 29.05 KG/M2 | OXYGEN SATURATION: 96 % | HEART RATE: 74 BPM

## 2025-05-21 DIAGNOSIS — I49.3 PVC'S (PREMATURE VENTRICULAR CONTRACTIONS): ICD-10-CM

## 2025-05-21 DIAGNOSIS — I42.1 CARDIOMYOPATHY, HYPERTROPHIC OBSTRUCTIVE (HCC): Primary | ICD-10-CM

## 2025-05-21 DIAGNOSIS — E78.00 HYPERCHOLESTEROLEMIA: ICD-10-CM

## 2025-05-21 DIAGNOSIS — I42.1 HYPERTROPHIC OBSTRUCTIVE CARDIOMYOPATHY (HCC): ICD-10-CM

## 2025-05-21 PROCEDURE — 93000 ELECTROCARDIOGRAM COMPLETE: CPT | Performed by: INTERNAL MEDICINE

## 2025-05-21 PROCEDURE — 99214 OFFICE O/P EST MOD 30 MIN: CPT | Performed by: INTERNAL MEDICINE

## 2025-05-21 NOTE — PROGRESS NOTES
Patient was seen today for a heart monitor placement ordered by Dr. PADILLA    Monitor type:zio xt   Duration:3 day   Serial number: [id number]    The monitor was applied and instructions were given.    Venus lr ma

## 2025-05-21 NOTE — PROGRESS NOTES
Patient Active Problem List   Diagnosis    Hyperlipidemia    Monocular esotropia of right eye    Neck pain    Cervical disc disorder    Cervical radiculopathy    Cervical spondylosis    Posttraumatic stress disorder    Major depressive disorder, single episode, moderate (HCC)       Current Outpatient Medications   Medication Sig Dispense Refill    metoprolol succinate (TOPROL XL) 100 MG extended release tablet TAKE 1 TABLET BY MOUTH TWICE  DAILY 180 tablet 3    amitriptyline (ELAVIL) 50 MG tablet TAKE 1 TABLET BY MOUTH EVERY NIGHT 30 tablet 2    aspirin 81 MG chewable tablet CHEW AND SWALLOW 1 TABLET BY MOUTH EVERY DAY 90 tablet 3    loratadine (CLARITIN) 10 MG tablet TAKE 1 TABLET BY MOUTH EVERY DAY      buPROPion (WELLBUTRIN XL) 150 MG extended release tablet Take by mouth      buPROPion (WELLBUTRIN XL) 300 MG extended release tablet Take by mouth      terazosin (HYTRIN) 10 MG capsule Take 1 capsule by mouth      atorvastatin (LIPITOR) 40 MG tablet Take 1 tablet by mouth daily 30 tablet 11    prazosin (MINIPRESS) 2 MG capsule Take 1 capsule by mouth nightly      omeprazole (PRILOSEC) 20 MG delayed release capsule Take 1 capsule by mouth  0    Lutein 6 MG CAPS Take 1 tablet by mouth daily Ld 1/21/2019      Multiple Vitamins-Minerals (THERAPEUTIC MULTIVITAMIN-MINERALS) tablet Take 1 tablet by mouth daily Ld 1/21/2019       No current facility-administered medications for this visit.       CC:    Patient is seen in follow up for:  1. Cardiomyopathy, hypertrophic obstructive (HCC) -severe septal asymmetric left ventricular hypertrophy    2. Hypercholesterolemia    3. PVC's (premature ventricular contractions)    4. Hypertrophic obstructive cardiomyopathy (HCC)        HPI:  Patient is doing well without any specific cardiac problems.  Patient denies any shortness of breath, chest pain, lightheadedness or dizziness.  Patient is tolerating medications well without side effects.      ROS:   General: No unusual weight

## 2025-05-30 ENCOUNTER — RESULTS FOLLOW-UP (OUTPATIENT)
Dept: CARDIOLOGY CLINIC | Age: 50
End: 2025-05-30

## 2025-05-30 DIAGNOSIS — I42.1 CARDIOMYOPATHY, HYPERTROPHIC OBSTRUCTIVE (HCC): ICD-10-CM

## 2025-05-30 NOTE — TELEPHONE ENCOUNTER
----- Message from Dr. Adonay Lee MD sent at 5/30/2025  3:04 PM EDT -----  Please let patient know:  Monitor looked PK. Same medications.

## 2025-06-04 ENCOUNTER — HOSPITAL ENCOUNTER (OUTPATIENT)
Dept: CARDIOLOGY | Age: 50
Discharge: HOME | End: 2025-06-06
Attending: INTERNAL MEDICINE
Payer: MEDICARE

## 2025-06-04 VITALS
HEIGHT: 72 IN | WEIGHT: 214 LBS | DIASTOLIC BLOOD PRESSURE: 70 MMHG | BODY MASS INDEX: 28.99 KG/M2 | SYSTOLIC BLOOD PRESSURE: 114 MMHG

## 2025-06-04 DIAGNOSIS — I42.1 HYPERTROPHIC OBSTRUCTIVE CARDIOMYOPATHY (HCC): ICD-10-CM

## 2025-06-04 PROCEDURE — 93306 TTE W/DOPPLER COMPLETE: CPT

## 2025-06-05 LAB
ECHO AO ANNULUS INDEX: 1.55 CM/M2
ECHO AO ASC DIAM: 2.7 CM
ECHO AO ASCENDING AORTA INDEX: 1.23 CM/M2
ECHO AO ROOT DIAM: 2.7 CM
ECHO AO ROOT INDEX: 1.23 CM/M2
ECHO AO ST JNCT DIAM: 2.7 CM
ECHO AV ANNULUS DIAM: 3.4 CM
ECHO AV CUSP MM: 2.4 CM
ECHO AV MEAN GRADIENT: 35 MMHG
ECHO AV PEAK GRADIENT: 81 MMHG
ECHO AV PEAK VELOCITY: 4.5 M/S
ECHO AV VTI: 92 CM
ECHO BSA: 2.22 M2
ECHO EST RA PRESSURE: 3 MMHG
ECHO LA DIAMETER INDEX: 1.87 CM/M2
ECHO LA DIAMETER: 4.1 CM
ECHO LA TO AORTIC ROOT RATIO: 1.52
ECHO LA VOL BP: 56 ML (ref 18–58)
ECHO LA VOL/BSA BIPLANE: 26 ML/M2 (ref 16–34)
ECHO LA VOLUME AREA LENGTH: 53 ML
ECHO LA VOLUME INDEX AREA LENGTH: 24 ML/M2 (ref 16–34)
ECHO LV E' LATERAL VELOCITY: 0.06 CM/S
ECHO LV E' SEPTAL VELOCITY: 0.05 CM/S
ECHO LV EF PHYSICIAN: 65 %
ECHO LV FRACTIONAL SHORTENING: 33 % (ref 28–44)
ECHO LV INTERNAL DIMENSION DIASTOLE INDEX: 1.96 CM/M2
ECHO LV INTERNAL DIMENSION DIASTOLIC: 4.3 CM (ref 4.2–5.9)
ECHO LV INTERNAL DIMENSION SYSTOLIC INDEX: 1.32 CM/M2
ECHO LV INTERNAL DIMENSION SYSTOLIC: 2.9 CM
ECHO LV ISOVOLUMETRIC RELAXATION TIME (IVRT): 107 MS
ECHO LV IVSD: 1.6 CM (ref 0.6–1)
ECHO LV IVSS: 2.3 CM
ECHO LV MASS 2D: 207.8 G (ref 88–224)
ECHO LV MASS INDEX 2D: 94.9 G/M2 (ref 49–115)
ECHO LV POSTERIOR WALL DIASTOLIC: 1 CM (ref 0.6–1)
ECHO LV POSTERIOR WALL SYSTOLIC: 1.6 CM
ECHO LV RELATIVE WALL THICKNESS RATIO: 0.47
ECHO LVOT AREA: 3.8 CM2
ECHO LVOT DIAM: 2.2 CM
ECHO MV "A" WAVE DURATION: 83 MSEC
ECHO MV A VELOCITY: 0.78 M/S
ECHO MV AREA PHT: 3.4 CM2
ECHO MV E VELOCITY: 0.64 M/S
ECHO MV E/A RATIO: 0.82
ECHO MV E/E' LATERAL: 1066.67
ECHO MV E/E' RATIO (AVERAGED): 1173.33
ECHO MV E/E' SEPTAL: 1280
ECHO MV MAX VELOCITY: 1 M/S
ECHO MV PRESSURE HALF TIME (PHT): 64 MS
ECHO PV MAX VELOCITY: 0.8 M/S
ECHO PV MEAN GRADIENT: 1 MMHG
ECHO PVEIN A DURATION: 90 MS
ECHO PVEIN A VELOCITY: 0.2 M/S
ECHO PVEIN PEAK D VELOCITY: 0.4 M/S
ECHO PVEIN PEAK S VELOCITY: 0.3 M/S
ECHO PVEIN S/D RATIO: 0.8
ECHO RA VOLUME AREA LENGTH APICAL 4 CHAMBER: 45 ML
ECHO RV INTERNAL DIMENSION: 2.3 CM
ECHO RV TAPSE: 2.3 CM (ref 1.7–?)
LV EF: 61 ML

## 2025-06-09 ENCOUNTER — TELEPHONE (OUTPATIENT)
Dept: FAMILY MEDICINE CLINIC | Age: 50
End: 2025-06-09

## 2025-06-09 NOTE — TELEPHONE ENCOUNTER
C9 auth requested 6 visits 5/19/25 to 11/19/25. Amended to 3 visits per physician review by Bridgett.  Appeal filed by this provider on 5/15/25.  Called Bridgett (Ayleen [phone]) regarding status of appeal.  He is scheduled for ROSIE on 7/3/25 in response to the appeal request.  Can file denied service claims to insurance with [facility] denial letter. Informed patient.  He prefers to  reschedule for 7/17/25 following report from ROSIE.

## 2025-06-22 DIAGNOSIS — F07.81 POST CONCUSSION SYNDROME: ICD-10-CM

## 2025-06-26 RX ORDER — MECLIZINE HYDROCHLORIDE 25 MG/1
25 TABLET ORAL 3 TIMES DAILY PRN
Qty: 90 TABLET | Refills: 0 | Status: SHIPPED | OUTPATIENT
Start: 2025-06-26 | End: 2025-07-26

## 2025-07-21 ENCOUNTER — TELEMEDICINE (OUTPATIENT)
Dept: PSYCHOLOGY | Age: 50
End: 2025-07-21
Payer: COMMERCIAL

## 2025-07-21 DIAGNOSIS — F32.1 MAJOR DEPRESSIVE DISORDER, SINGLE EPISODE, MODERATE (HCC): ICD-10-CM

## 2025-07-21 DIAGNOSIS — F43.10 POSTTRAUMATIC STRESS DISORDER: Primary | ICD-10-CM

## 2025-07-21 PROCEDURE — 90832 PSYTX W PT 30 MINUTES: CPT | Performed by: PSYCHOLOGIST

## 2025-07-21 NOTE — PROGRESS NOTES
WORKER'S COMPENSATION  Behavioral Health Follow-Up  Manuel Saud [facility]    Time Start: 11:00 am   Time End:  11:35 am    Date of Service:  7/21/25    THIS IS A WORKER'S COMPENSATION VISIT. PLEASE BILL TO ELIZABETH.   CLAIM # [id number]. DOI = 3/24/19. C9 AUTHORIZATION  (5/19/25 to 11/19/25).      Note:  Current c9 under appeal (6 visits in 6 months amended to 3 visits in 6 months). Letter to support Hernando Posada's appeal faxed, see attached letter.      Jorge lAvarado, was evaluated through a synchronous (real-time) audio-video encounter. Conducting video visits due to patient's difficulty driving secondary to his chronic pain and post-concussive symptoms. The patient (and/or guardian if applicable) is aware that this is a billable service, which includes applicable co-pays. This virtual visit was conducted with patient's (and/or legal guardian's) consent. Patient identification was verified, and a caregiver was present when appropriate. Telehealth services provided due to patient's inability to drive secondary to his work-related injury.  The patient was located at Home:    [address] [place] [address]       The provider was located at Facility (Login Dept): [facility] PRIMARY [facility]  [address] [place] [address] [phone]     Total time spent on this encounter: 30 minutes        Symptoms reported: Anxiety, stress    Impact on functioning: Socialization outside the home    Mental Status:    Appearance: casually dressed   Affect:  Mood congruent   Attitude: Cooperative   Mood:   Dysphoric   Thought Process:  Linear and goal directed   Delusions: no evidence of delusions   Perceptions: No perceptual disturbance   Behavior:   Cooperative   Psychomotor: WNL   Speech:   Soft   Eye Contact: good   Orientation:  oriented to person, place, time, and general circumstances   Judgment & Insight:  normal insight and judgment       Risk Assessment:  Current Suicide &

## 2025-08-21 RX ORDER — METOPROLOL SUCCINATE 100 MG/1
100 TABLET, EXTENDED RELEASE ORAL 2 TIMES DAILY
Qty: 180 TABLET | Refills: 3 | Status: SHIPPED | OUTPATIENT
Start: 2025-08-21

## 2025-08-25 ENCOUNTER — TELEMEDICINE (OUTPATIENT)
Dept: PSYCHOLOGY | Age: 50
End: 2025-08-25
Payer: COMMERCIAL

## 2025-08-25 DIAGNOSIS — F43.10 POSTTRAUMATIC STRESS DISORDER: Primary | ICD-10-CM

## 2025-08-25 DIAGNOSIS — F32.1 MAJOR DEPRESSIVE DISORDER, SINGLE EPISODE, MODERATE (HCC): ICD-10-CM

## 2025-08-25 PROCEDURE — 90832 PSYTX W PT 30 MINUTES: CPT | Performed by: PSYCHOLOGIST

## (undated) DEVICE — 3M™ RED DOT™ MONITORING ELECTRODE WITH FOAM TAPE AND STICKY GEL 2560, 50/BAG, 20/CASE, 72/PLT: Brand: RED DOT™

## (undated) DEVICE — SOLUTION IRRIGATION BAL SALT SOLUTION 15 ML STRL BSS

## (undated) DEVICE — 6 ML SYRINGE LUER-LOCK TIP: Brand: MONOJECT

## (undated) DEVICE — STRIP,CLOSURE,WOUND,MEDI-STRIP,1/2X4: Brand: MEDLINE

## (undated) DEVICE — GAUZE,SPONGE,4"X4",8PLY,STRL,LF,10/TRAY: Brand: MEDLINE

## (undated) DEVICE — TOWEL,OR,DSP,ST,BLUE,STD,6/PK,12PK/CS: Brand: MEDLINE

## (undated) DEVICE — NEEDLE HYPO 18GA L1.5IN PNK POLYPR HUB S STL THN WALL FILL

## (undated) DEVICE — BASIC SINGLE BASIN 1-LF: Brand: MEDLINE INDUSTRIES, INC.

## (undated) DEVICE — TRAY EPI SGL DOSE 18GA NDL CUST AULTMAN HOSP

## (undated) DEVICE — 12 ML SYRINGE,LUER-LOCK TIP: Brand: MONOJECT

## (undated) DEVICE — NEEDLE HYPO 25GA L1.5IN BLU POLYPR HUB S STL REG BVL STR

## (undated) DEVICE — COVER HNDL LT DISP

## (undated) DEVICE — Z DISCONTINUED APPLICATOR SURG PREP 0.35OZ 2% CHG 70% ISO ALC W/ HI LT

## (undated) DEVICE — COUNTER NDL 30 COUNT DBL MAG

## (undated) DEVICE — GOWN,SIRUS,FABRNF,L,20/CS: Brand: MEDLINE

## (undated) DEVICE — BANDAGE ADH W1XL3IN NAT FAB WVN FLX DURABLE N ADH PD SEAL

## (undated) DEVICE — 4-PORT MANIFOLD: Brand: NEPTUNE 2

## (undated) DEVICE — GAUZE,SPONGE,4"X4",12PLY,STERILE,LF,2'S: Brand: MEDLINE

## (undated) DEVICE — SURGICAL PROCEDURE PACK EENT CUST

## (undated) DEVICE — 3 ML SYRINGE LUER-LOCK TIP: Brand: MONOJECT

## (undated) DEVICE — COVER,LIGHT HANDLE,FLX,2/PK: Brand: MEDLINE INDUSTRIES, INC.

## (undated) DEVICE — GLOVE ORANGE PI 7 1/2   MSG9075

## (undated) DEVICE — SOLUTION IV IRRIG WATER 1000ML POUR BRL 2F7114